# Patient Record
Sex: MALE | Race: BLACK OR AFRICAN AMERICAN | NOT HISPANIC OR LATINO | URBAN - METROPOLITAN AREA
[De-identification: names, ages, dates, MRNs, and addresses within clinical notes are randomized per-mention and may not be internally consistent; named-entity substitution may affect disease eponyms.]

---

## 2020-06-04 ENCOUNTER — OFFICE VISIT (OUTPATIENT)
Dept: URBAN - METROPOLITAN AREA CLINIC 97 | Facility: CLINIC | Age: 55
End: 2020-06-04
Payer: MEDICARE

## 2020-06-04 VITALS
BODY MASS INDEX: 32.21 KG/M2 | TEMPERATURE: 97.6 F | HEIGHT: 70 IN | DIASTOLIC BLOOD PRESSURE: 81 MMHG | SYSTOLIC BLOOD PRESSURE: 108 MMHG | RESPIRATION RATE: 18 BRPM | WEIGHT: 225 LBS

## 2020-06-04 DIAGNOSIS — K51.80 OTHER ULCERATIVE COLITIS WITHOUT COMPLICATION: ICD-10-CM

## 2020-06-04 PROCEDURE — 96415 CHEMO IV INFUSION ADDL HR: CPT | Performed by: INTERNAL MEDICINE

## 2020-06-04 PROCEDURE — 96413 CHEMO IV INFUSION 1 HR: CPT | Performed by: INTERNAL MEDICINE

## 2020-06-04 PROCEDURE — 96375 TX/PRO/DX INJ NEW DRUG ADDON: CPT | Performed by: INTERNAL MEDICINE

## 2020-06-04 RX ORDER — INFLIXIMAB 100 MG/10ML
INJECTION, POWDER, LYOPHILIZED, FOR SOLUTION INTRAVENOUS
Qty: 0 | Refills: 0 | Status: ACTIVE | COMMUNITY
Start: 1900-01-01 | End: 1900-01-01

## 2020-06-04 RX ORDER — OLMESARTAN MEDOXOMIL-HYDROCHLOROTHIAZIDE 12.5; 2 MG/1; MG/1
TABLET, FILM COATED ORAL
Qty: 0 | Refills: 0 | Status: ACTIVE | COMMUNITY
Start: 1900-01-01 | End: 1900-01-01

## 2020-06-04 RX ORDER — GLUCOSAMINE SULFATE 500 MG
TABLET ORAL
Qty: 0 | Refills: 0 | Status: ACTIVE | COMMUNITY
Start: 1900-01-01 | End: 1900-01-01

## 2020-07-30 ENCOUNTER — OFFICE VISIT (OUTPATIENT)
Dept: URBAN - METROPOLITAN AREA CLINIC 97 | Facility: CLINIC | Age: 55
End: 2020-07-30
Payer: MEDICARE

## 2020-07-30 VITALS
TEMPERATURE: 97.6 F | WEIGHT: 216 LBS | RESPIRATION RATE: 18 BRPM | BODY MASS INDEX: 30.92 KG/M2 | HEIGHT: 70 IN | DIASTOLIC BLOOD PRESSURE: 70 MMHG | SYSTOLIC BLOOD PRESSURE: 121 MMHG

## 2020-07-30 DIAGNOSIS — K51.80 CHRONIC PANCOLONIC ULCERATIVE COLITIS: ICD-10-CM

## 2020-07-30 PROCEDURE — 96375 TX/PRO/DX INJ NEW DRUG ADDON: CPT | Performed by: INTERNAL MEDICINE

## 2020-07-30 PROCEDURE — 96413 CHEMO IV INFUSION 1 HR: CPT | Performed by: INTERNAL MEDICINE

## 2020-07-30 PROCEDURE — 96415 CHEMO IV INFUSION ADDL HR: CPT | Performed by: INTERNAL MEDICINE

## 2020-07-30 RX ORDER — GLUCOSAMINE SULFATE 500 MG
TABLET ORAL
Qty: 0 | Refills: 0 | Status: ACTIVE | COMMUNITY
Start: 1900-01-01 | End: 1900-01-01

## 2020-07-30 RX ORDER — INFLIXIMAB 100 MG/10ML
INJECTION, POWDER, LYOPHILIZED, FOR SOLUTION INTRAVENOUS
Qty: 0 | Refills: 0 | Status: ACTIVE | COMMUNITY
Start: 1900-01-01 | End: 1900-01-01

## 2020-07-30 RX ORDER — OLMESARTAN MEDOXOMIL-HYDROCHLOROTHIAZIDE 12.5; 2 MG/1; MG/1
TABLET, FILM COATED ORAL
Qty: 0 | Refills: 0 | Status: ACTIVE | COMMUNITY
Start: 1900-01-01 | End: 1900-01-01

## 2020-08-07 ENCOUNTER — OFFICE VISIT (OUTPATIENT)
Dept: URBAN - METROPOLITAN AREA CLINIC 92 | Facility: CLINIC | Age: 55
End: 2020-08-07
Payer: MEDICARE

## 2020-08-07 VITALS
BODY MASS INDEX: 30.78 KG/M2 | TEMPERATURE: 94.5 F | DIASTOLIC BLOOD PRESSURE: 78 MMHG | HEIGHT: 70 IN | SYSTOLIC BLOOD PRESSURE: 116 MMHG | HEART RATE: 95 BPM | WEIGHT: 215 LBS

## 2020-08-07 DIAGNOSIS — K51.90 ULCERATIVE COLITIS WITHOUT COMPLICATIONS, UNSPECIFIED LOCATION: ICD-10-CM

## 2020-08-07 PROCEDURE — 99214 OFFICE O/P EST MOD 30 MIN: CPT | Performed by: INTERNAL MEDICINE

## 2020-08-07 PROCEDURE — G8417 CALC BMI ABV UP PARAM F/U: HCPCS | Performed by: INTERNAL MEDICINE

## 2020-08-07 PROCEDURE — 82306 VITAMIN D 25 HYDROXY: CPT | Performed by: INTERNAL MEDICINE

## 2020-08-07 PROCEDURE — 3017F COLORECTAL CA SCREEN DOC REV: CPT | Performed by: INTERNAL MEDICINE

## 2020-08-07 PROCEDURE — G8427 DOCREV CUR MEDS BY ELIG CLIN: HCPCS | Performed by: INTERNAL MEDICINE

## 2020-08-07 PROCEDURE — 1036F TOBACCO NON-USER: CPT | Performed by: INTERNAL MEDICINE

## 2020-08-07 RX ORDER — SERTRALINE HYDROCHLORIDE 20 MG/ML
5 ML MIXED WITH 4 OUNCES OF WATER, ORANGE JUICE, LEMONADE, GINGER ALE OR LEMON/LIME SODA SOLUTION ORAL ONCE A DAY
Status: ACTIVE | COMMUNITY

## 2020-08-07 RX ORDER — OLMESARTAN MEDOXOMIL-HYDROCHLOROTHIAZIDE 12.5; 2 MG/1; MG/1
TABLET, FILM COATED ORAL
Qty: 0 | Refills: 0 | Status: ACTIVE | COMMUNITY
Start: 1900-01-01

## 2020-08-07 RX ORDER — FAMOTIDINE 20 MG/1
1 TABLET AT BEDTIME AS NEEDED TABLET, FILM COATED ORAL ONCE A DAY
Status: ACTIVE | COMMUNITY

## 2020-08-07 RX ORDER — INFLIXIMAB 100 MG/10ML
INJECTION, POWDER, LYOPHILIZED, FOR SOLUTION INTRAVENOUS
Qty: 0 | Refills: 0 | Status: ACTIVE | COMMUNITY
Start: 1900-01-01

## 2020-08-07 RX ORDER — GLUCOSAMINE SULFATE 500 MG
TABLET ORAL
Qty: 0 | Refills: 0 | COMMUNITY
Start: 1900-01-01

## 2020-08-07 NOTE — HPI-TODAY'S VISIT:
Pt with hx of UC , left sided dx in 2011. Previously followed by Dr. Osorio. Has been on Remicade since 2011 as he did not respond to oral treatments. No known complications or surgeries. DId have flare symptoms last month and given steroid taper by PCP. Now feels improved. No hematochezia. Intermittent loose stools. No f/c, abd pain, wt loss or joint pains. Last infusion last week.  Colonoscopy in 2019 with 1cm transverse colon adenoma. No bx obtained around polyp base.

## 2020-08-08 LAB
A/G RATIO: 1.6
ALBUMIN: 4.5
ALKALINE PHOSPHATASE: 55
ALT (SGPT): 21
AST (SGOT): 26
BASO (ABSOLUTE): 0
BASOS: 1
BILIRUBIN, TOTAL: 1.1
BUN/CREATININE RATIO: 9
BUN: 14
CALCIUM: 9.7
CARBON DIOXIDE, TOTAL: 20
CHLORIDE: 101
CREATININE: 1.53
EGFR IF AFRICN AM: 59
EGFR IF NONAFRICN AM: 51
EOS (ABSOLUTE): 0.1
EOS: 1
GLOBULIN, TOTAL: 2.8
GLUCOSE: 102
HEMATOCRIT: 44.2
HEMATOLOGY COMMENTS:: (no result)
HEMOGLOBIN: 14.1
IMMATURE CELLS: (no result)
IMMATURE GRANS (ABS): 0.1
IMMATURE GRANULOCYTES: 1
LYMPHS (ABSOLUTE): 2.4
LYMPHS: 27
MCH: 30.7
MCHC: 31.9
MCV: 96
MONOCYTES(ABSOLUTE): 0.9
MONOCYTES: 10
NEUTROPHILS (ABSOLUTE): 5.3
NEUTROPHILS: 60
NRBC: (no result)
PLATELETS: 305
POTASSIUM: 4.3
PROTEIN, TOTAL: 7.3
RBC: 4.6
RDW: 12.6
SEDIMENTATION RATE-WESTERGREN: 7
SODIUM: 140
VITAMIN D, 25-HYDROXY: 20.5
WBC: 8.7

## 2020-08-12 ENCOUNTER — TELEPHONE ENCOUNTER (OUTPATIENT)
Dept: URBAN - METROPOLITAN AREA CLINIC 92 | Facility: CLINIC | Age: 55
End: 2020-08-12

## 2020-08-13 ENCOUNTER — TELEPHONE ENCOUNTER (OUTPATIENT)
Dept: URBAN - METROPOLITAN AREA CLINIC 92 | Facility: CLINIC | Age: 55
End: 2020-08-13

## 2020-08-14 ENCOUNTER — CLAIMS CREATED FROM THE CLAIM WINDOW (OUTPATIENT)
Dept: URBAN - METROPOLITAN AREA CLINIC 4 | Facility: CLINIC | Age: 55
End: 2020-08-14
Payer: MEDICARE

## 2020-08-14 ENCOUNTER — OFFICE VISIT (OUTPATIENT)
Dept: URBAN - METROPOLITAN AREA SURGERY CENTER 16 | Facility: SURGERY CENTER | Age: 55
End: 2020-08-14
Payer: MEDICARE

## 2020-08-14 DIAGNOSIS — K63.89 OTHER SPECIFIED DISEASES OF INTESTINE: ICD-10-CM

## 2020-08-14 DIAGNOSIS — K51.911 ULCERATIVE COLITIS, UNSPECIFIED WITH RECTAL BLEEDING: ICD-10-CM

## 2020-08-14 DIAGNOSIS — K51.20 CHRONIC ULCERATIVE PROCTITIS: ICD-10-CM

## 2020-08-14 DIAGNOSIS — K51.80 CHRONIC PANCOLONIC ULCERATIVE COLITIS: ICD-10-CM

## 2020-08-14 PROCEDURE — 88305 TISSUE EXAM BY PATHOLOGIST: CPT | Performed by: PATHOLOGY

## 2020-08-14 PROCEDURE — 45380 COLONOSCOPY AND BIOPSY: CPT | Performed by: INTERNAL MEDICINE

## 2020-08-14 PROCEDURE — G8907 PT DOC NO EVENTS ON DISCHARG: HCPCS | Performed by: INTERNAL MEDICINE

## 2020-08-14 PROCEDURE — G9937 DIG OR SURV COLSCO: HCPCS | Performed by: INTERNAL MEDICINE

## 2020-08-14 RX ORDER — FAMOTIDINE 20 MG/1
1 TABLET AT BEDTIME AS NEEDED TABLET, FILM COATED ORAL ONCE A DAY
Status: ACTIVE | COMMUNITY

## 2020-08-14 RX ORDER — SERTRALINE HYDROCHLORIDE 20 MG/ML
5 ML MIXED WITH 4 OUNCES OF WATER, ORANGE JUICE, LEMONADE, GINGER ALE OR LEMON/LIME SODA SOLUTION ORAL ONCE A DAY
Status: ACTIVE | COMMUNITY

## 2020-08-14 RX ORDER — INFLIXIMAB 100 MG/10ML
INJECTION, POWDER, LYOPHILIZED, FOR SOLUTION INTRAVENOUS
Qty: 0 | Refills: 0 | Status: ACTIVE | COMMUNITY
Start: 1900-01-01

## 2020-08-14 RX ORDER — OLMESARTAN MEDOXOMIL-HYDROCHLOROTHIAZIDE 12.5; 2 MG/1; MG/1
TABLET, FILM COATED ORAL
Qty: 0 | Refills: 0 | Status: ACTIVE | COMMUNITY
Start: 1900-01-01

## 2020-08-14 RX ORDER — GLUCOSAMINE SULFATE 500 MG
TABLET ORAL
Qty: 0 | Refills: 0 | COMMUNITY
Start: 1900-01-01

## 2020-09-03 ENCOUNTER — TELEPHONE ENCOUNTER (OUTPATIENT)
Dept: URBAN - METROPOLITAN AREA CLINIC 92 | Facility: CLINIC | Age: 55
End: 2020-09-03

## 2020-09-03 ENCOUNTER — OFFICE VISIT (OUTPATIENT)
Dept: URBAN - METROPOLITAN AREA TELEHEALTH 2 | Facility: TELEHEALTH | Age: 55
End: 2020-09-03
Payer: MEDICARE

## 2020-09-03 DIAGNOSIS — E56.8 DEFICIENCY OF OTHER VITAMINS: ICD-10-CM

## 2020-09-03 DIAGNOSIS — K51.811 OTHER ULCERATIVE COLITIS WITH RECTAL BLEEDING: ICD-10-CM

## 2020-09-03 PROCEDURE — G9903 PT SCRN TBCO ID AS NON USER: HCPCS | Performed by: INTERNAL MEDICINE

## 2020-09-03 PROCEDURE — 1036F TOBACCO NON-USER: CPT | Performed by: INTERNAL MEDICINE

## 2020-09-03 PROCEDURE — G8427 DOCREV CUR MEDS BY ELIG CLIN: HCPCS | Performed by: INTERNAL MEDICINE

## 2020-09-03 PROCEDURE — 84999 UNLISTED CHEMISTRY PROCEDURE: CPT | Performed by: INTERNAL MEDICINE

## 2020-09-03 PROCEDURE — 99214 OFFICE O/P EST MOD 30 MIN: CPT | Performed by: INTERNAL MEDICINE

## 2020-09-03 PROCEDURE — 3017F COLORECTAL CA SCREEN DOC REV: CPT | Performed by: INTERNAL MEDICINE

## 2020-09-03 RX ORDER — GLUCOSAMINE SULFATE 500 MG
TABLET ORAL
Qty: 0 | Refills: 0 | COMMUNITY
Start: 1900-01-01

## 2020-09-03 RX ORDER — OLMESARTAN MEDOXOMIL-HYDROCHLOROTHIAZIDE 12.5; 2 MG/1; MG/1
TABLET, FILM COATED ORAL
Qty: 0 | Refills: 0 | Status: ACTIVE | COMMUNITY
Start: 1900-01-01

## 2020-09-03 RX ORDER — INFLIXIMAB 100 MG/10ML
INJECTION, POWDER, LYOPHILIZED, FOR SOLUTION INTRAVENOUS
Qty: 0 | Refills: 0 | Status: ACTIVE | COMMUNITY
Start: 1900-01-01

## 2020-09-03 RX ORDER — FAMOTIDINE 20 MG/1
1 TABLET AT BEDTIME AS NEEDED TABLET, FILM COATED ORAL ONCE A DAY
Status: ACTIVE | COMMUNITY

## 2020-09-03 RX ORDER — SERTRALINE HYDROCHLORIDE 20 MG/ML
5 ML MIXED WITH 4 OUNCES OF WATER, ORANGE JUICE, LEMONADE, GINGER ALE OR LEMON/LIME SODA SOLUTION ORAL ONCE A DAY
Status: ACTIVE | COMMUNITY

## 2020-09-03 NOTE — HPI-TODAY'S VISIT:
Pt with hx of UC , left sided dx in 2011. Previously followed by Dr. Osorio. Has been on Remicade 5mg/lg q8 weeks since 2011 as he did not respond to oral treatments. No known complications or surgeries. DId have flare symptoms last month and given steroid taper by PCP which improved. No hematochezia. Intermittent loose stools. No f/c, abd pain, wt loss or joint pains.   Colonoscopy in 2019 with 1cm transverse colon adenoma. No bx obtained around polyp base. Repeat colonoscopy 8/2020 with mild inflammation upto 15cm from anal verge. Remaining colon normal including bx.   Recent labs with mild CKD (known) and vit d def

## 2020-09-11 ENCOUNTER — TELEPHONE ENCOUNTER (OUTPATIENT)
Dept: URBAN - METROPOLITAN AREA CLINIC 92 | Facility: CLINIC | Age: 55
End: 2020-09-11

## 2020-09-23 ENCOUNTER — OFFICE VISIT (OUTPATIENT)
Dept: URBAN - METROPOLITAN AREA CLINIC 97 | Facility: CLINIC | Age: 55
End: 2020-09-23
Payer: MEDICARE

## 2020-09-23 DIAGNOSIS — K51.90 COLITIS, ULCERATIVE: ICD-10-CM

## 2020-09-23 PROCEDURE — 96375 TX/PRO/DX INJ NEW DRUG ADDON: CPT | Performed by: INTERNAL MEDICINE

## 2020-09-23 PROCEDURE — 96415 CHEMO IV INFUSION ADDL HR: CPT | Performed by: INTERNAL MEDICINE

## 2020-09-23 PROCEDURE — 96413 CHEMO IV INFUSION 1 HR: CPT | Performed by: INTERNAL MEDICINE

## 2020-09-23 RX ORDER — GLUCOSAMINE SULFATE 500 MG
TABLET ORAL
Qty: 0 | Refills: 0 | COMMUNITY
Start: 1900-01-01

## 2020-09-23 RX ORDER — INFLIXIMAB 100 MG/10ML
INJECTION, POWDER, LYOPHILIZED, FOR SOLUTION INTRAVENOUS
Qty: 0 | Refills: 0 | Status: ACTIVE | COMMUNITY
Start: 1900-01-01

## 2020-09-23 RX ORDER — SERTRALINE HYDROCHLORIDE 20 MG/ML
5 ML MIXED WITH 4 OUNCES OF WATER, ORANGE JUICE, LEMONADE, GINGER ALE OR LEMON/LIME SODA SOLUTION ORAL ONCE A DAY
Status: ACTIVE | COMMUNITY

## 2020-09-23 RX ORDER — OLMESARTAN MEDOXOMIL-HYDROCHLOROTHIAZIDE 12.5; 2 MG/1; MG/1
TABLET, FILM COATED ORAL
Qty: 0 | Refills: 0 | Status: ACTIVE | COMMUNITY
Start: 1900-01-01

## 2020-09-23 RX ORDER — FAMOTIDINE 20 MG/1
1 TABLET AT BEDTIME AS NEEDED TABLET, FILM COATED ORAL ONCE A DAY
Status: ACTIVE | COMMUNITY

## 2020-10-06 ENCOUNTER — OFFICE VISIT (OUTPATIENT)
Dept: URBAN - METROPOLITAN AREA TELEHEALTH 2 | Facility: TELEHEALTH | Age: 55
End: 2020-10-06
Payer: MEDICARE

## 2020-10-06 DIAGNOSIS — K51.911 ULCERATIVE COLITIS, UNSPECIFIED WITH RECTAL BLEEDING: ICD-10-CM

## 2020-10-06 DIAGNOSIS — E55.9 VITAMIN D DEFICIENCY: ICD-10-CM

## 2020-10-06 DIAGNOSIS — K51.811 OTHER ULCERATIVE COLITIS WITH RECTAL BLEEDING: ICD-10-CM

## 2020-10-06 PROCEDURE — G8417 CALC BMI ABV UP PARAM F/U: HCPCS | Performed by: INTERNAL MEDICINE

## 2020-10-06 PROCEDURE — 99214 OFFICE O/P EST MOD 30 MIN: CPT | Performed by: INTERNAL MEDICINE

## 2020-10-06 PROCEDURE — 3017F COLORECTAL CA SCREEN DOC REV: CPT | Performed by: INTERNAL MEDICINE

## 2020-10-06 PROCEDURE — G8427 DOCREV CUR MEDS BY ELIG CLIN: HCPCS | Performed by: INTERNAL MEDICINE

## 2020-10-06 RX ORDER — GLUCOSAMINE SULFATE 500 MG
TABLET ORAL
Qty: 0 | Refills: 0 | COMMUNITY
Start: 1900-01-01

## 2020-10-06 RX ORDER — OLMESARTAN MEDOXOMIL-HYDROCHLOROTHIAZIDE 12.5; 2 MG/1; MG/1
TABLET, FILM COATED ORAL
Qty: 0 | Refills: 0 | Status: ACTIVE | COMMUNITY
Start: 1900-01-01

## 2020-10-06 RX ORDER — SERTRALINE HYDROCHLORIDE 20 MG/ML
5 ML MIXED WITH 4 OUNCES OF WATER, ORANGE JUICE, LEMONADE, GINGER ALE OR LEMON/LIME SODA SOLUTION ORAL ONCE A DAY
Status: ACTIVE | COMMUNITY

## 2020-10-06 RX ORDER — FAMOTIDINE 20 MG/1
1 TABLET AT BEDTIME AS NEEDED TABLET, FILM COATED ORAL ONCE A DAY
Status: ACTIVE | COMMUNITY

## 2020-10-06 RX ORDER — INFLIXIMAB 100 MG/10ML
INJECTION, POWDER, LYOPHILIZED, FOR SOLUTION INTRAVENOUS
Qty: 0 | Refills: 0 | Status: ACTIVE | COMMUNITY
Start: 1900-01-01

## 2020-10-06 RX ORDER — ADALIMUMAB 80MG/0.8ML
80MG DAY 1, DAY 2, DAY 15 KIT SUBCUTANEOUS
Qty: 3 | Refills: 0 | OUTPATIENT

## 2020-10-08 ENCOUNTER — TELEPHONE ENCOUNTER (OUTPATIENT)
Dept: URBAN - METROPOLITAN AREA CLINIC 92 | Facility: CLINIC | Age: 55
End: 2020-10-08

## 2020-10-13 ENCOUNTER — TELEPHONE ENCOUNTER (OUTPATIENT)
Dept: URBAN - METROPOLITAN AREA CLINIC 92 | Facility: CLINIC | Age: 55
End: 2020-10-13

## 2020-10-14 ENCOUNTER — LAB OUTSIDE AN ENCOUNTER (OUTPATIENT)
Dept: URBAN - METROPOLITAN AREA CLINIC 92 | Facility: CLINIC | Age: 55
End: 2020-10-14

## 2020-10-19 ENCOUNTER — TELEPHONE ENCOUNTER (OUTPATIENT)
Dept: URBAN - METROPOLITAN AREA CLINIC 92 | Facility: CLINIC | Age: 55
End: 2020-10-19

## 2020-10-22 LAB
QUANTIFERON CRITERIA: (no result)
QUANTIFERON INCUBATION: (no result)
QUANTIFERON MITOGEN VALUE: >10
QUANTIFERON NIL VALUE: 0.37
QUANTIFERON TB1 AG VALUE: 0.48
QUANTIFERON TB2 AG VALUE: 0.46
QUANTIFERON-TB GOLD PLUS: NEGATIVE

## 2020-11-03 ENCOUNTER — TELEPHONE ENCOUNTER (OUTPATIENT)
Dept: URBAN - METROPOLITAN AREA CLINIC 92 | Facility: CLINIC | Age: 55
End: 2020-11-03

## 2020-11-09 ENCOUNTER — TELEPHONE ENCOUNTER (OUTPATIENT)
Dept: URBAN - METROPOLITAN AREA CLINIC 92 | Facility: CLINIC | Age: 55
End: 2020-11-09

## 2020-11-18 ENCOUNTER — OFFICE VISIT (OUTPATIENT)
Dept: URBAN - METROPOLITAN AREA CLINIC 97 | Facility: CLINIC | Age: 55
End: 2020-11-18

## 2020-11-20 ENCOUNTER — ERX REFILL RESPONSE (OUTPATIENT)
Age: 55
End: 2020-11-20

## 2020-11-20 RX ORDER — ADALIMUMAB 80MG/0.8ML
INJECT 80MG UNDER THE SKIN ON DAY 1, DAY 2, AND ON DAY 15 KIT SUBCUTANEOUS
Qty: 3 | Refills: 0

## 2020-11-25 ENCOUNTER — TELEPHONE ENCOUNTER (OUTPATIENT)
Dept: URBAN - METROPOLITAN AREA CLINIC 92 | Facility: CLINIC | Age: 55
End: 2020-11-25

## 2020-11-25 RX ORDER — ADALIMUMAB 40MG/0.4ML
1 SUBQ  Q 2 WEEKS KIT SUBCUTANEOUS
Qty: 6 | Refills: 0 | OUTPATIENT
Start: 2020-12-01 | End: 2021-03-01

## 2020-12-29 ENCOUNTER — OFFICE VISIT (OUTPATIENT)
Dept: URBAN - METROPOLITAN AREA CLINIC 92 | Facility: CLINIC | Age: 55
End: 2020-12-29
Payer: MEDICARE

## 2020-12-29 DIAGNOSIS — K51.911 ULCERATIVE COLITIS, UNSPECIFIED WITH RECTAL BLEEDING: ICD-10-CM

## 2020-12-29 PROCEDURE — G8417 CALC BMI ABV UP PARAM F/U: HCPCS | Performed by: INTERNAL MEDICINE

## 2020-12-29 PROCEDURE — G8482 FLU IMMUNIZE ORDER/ADMIN: HCPCS | Performed by: INTERNAL MEDICINE

## 2020-12-29 PROCEDURE — 4004F PT TOBACCO SCREEN RCVD TLK: CPT | Performed by: INTERNAL MEDICINE

## 2020-12-29 PROCEDURE — G8427 DOCREV CUR MEDS BY ELIG CLIN: HCPCS | Performed by: INTERNAL MEDICINE

## 2020-12-29 PROCEDURE — 3017F COLORECTAL CA SCREEN DOC REV: CPT | Performed by: INTERNAL MEDICINE

## 2020-12-29 PROCEDURE — 99214 OFFICE O/P EST MOD 30 MIN: CPT | Performed by: INTERNAL MEDICINE

## 2020-12-29 RX ORDER — GLUCOSAMINE SULFATE 500 MG
TABLET ORAL
Qty: 0 | Refills: 0 | COMMUNITY
Start: 1900-01-01

## 2020-12-29 RX ORDER — ADALIMUMAB 40MG/0.4ML
1 SUBQ  Q 2 WEEKS KIT SUBCUTANEOUS
Qty: 6 | Refills: 0 | Status: ACTIVE | COMMUNITY
Start: 2020-12-01 | End: 2021-03-01

## 2020-12-29 RX ORDER — OLMESARTAN MEDOXOMIL-HYDROCHLOROTHIAZIDE 12.5; 2 MG/1; MG/1
TABLET, FILM COATED ORAL
Qty: 0 | Refills: 0 | Status: ACTIVE | COMMUNITY
Start: 1900-01-01

## 2020-12-29 RX ORDER — FAMOTIDINE 20 MG/1
1 TABLET AT BEDTIME AS NEEDED TABLET, FILM COATED ORAL ONCE A DAY
Status: ACTIVE | COMMUNITY

## 2020-12-29 NOTE — HPI-TODAY'S VISIT:
Pt with hx of UC , left sided dx in 2011. Previously followed by Dr. Osorio. Has been on Remicade 5mg/lg q8 weeks since 2011 as he did not respond to oral treatments. No known complications or surgeries. Pt was flaring and Prometheus panel shows undetectable remicade levels and presence of antibodies. He was transitioned to Humira in 11/2020 and now feels back to baseline. 1BM per day. Non-bloody. Previous joint pains have resolved.   Colonoscopy in 2019 with 1cm transverse colon adenoma. No bx obtained around polyp base. Repeat colonoscopy 8/2020 with mild inflammation upto 15cm from anal verge. Remaining colon normal including bx.

## 2020-12-30 LAB
A/G RATIO: 1.4
ALBUMIN: 4.3
ALKALINE PHOSPHATASE: 65
ALT (SGPT): 17
AST (SGOT): 29
BASO (ABSOLUTE): 0
BASOS: 1
BILIRUBIN, TOTAL: 0.8
BUN/CREATININE RATIO: 14
BUN: 17
CALCIUM: 10.2
CARBON DIOXIDE, TOTAL: 22
CHLORIDE: 101
CREATININE: 1.23
EGFR IF AFRICN AM: 77
EGFR IF NONAFRICN AM: 67
EOS (ABSOLUTE): 0.1
EOS: 2
GLOBULIN, TOTAL: 3
GLUCOSE: 92
HEMATOCRIT: 49.2
HEMATOLOGY COMMENTS:: (no result)
HEMOGLOBIN: 16.8
IMMATURE CELLS: (no result)
IMMATURE GRANS (ABS): 0
IMMATURE GRANULOCYTES: 0
LYMPHS (ABSOLUTE): 3.1
LYMPHS: 55
MCH: 30.5
MCHC: 34.1
MCV: 90
MONOCYTES(ABSOLUTE): 0.5
MONOCYTES: 8
NEUTROPHILS (ABSOLUTE): 2
NEUTROPHILS: 34
NRBC: (no result)
PLATELETS: 224
POTASSIUM: 4.3
PROTEIN, TOTAL: 7.3
RBC: 5.5
RDW: 13.2
SODIUM: 140
VITAMIN D, 25-HYDROXY: 86.6
WBC: 5.7

## 2021-01-13 ENCOUNTER — TELEPHONE ENCOUNTER (OUTPATIENT)
Dept: URBAN - METROPOLITAN AREA CLINIC 92 | Facility: CLINIC | Age: 56
End: 2021-01-13

## 2021-02-23 ENCOUNTER — TELEPHONE ENCOUNTER (OUTPATIENT)
Dept: URBAN - METROPOLITAN AREA CLINIC 92 | Facility: CLINIC | Age: 56
End: 2021-02-23

## 2021-02-23 RX ORDER — ADALIMUMAB 40MG/0.4ML
1 SUBQ  Q 2 WEEKS KIT SUBCUTANEOUS
Qty: 6 | Refills: 0 | OUTPATIENT
Start: 2021-02-24 | End: 2021-05-25

## 2021-03-30 ENCOUNTER — TELEPHONE ENCOUNTER (OUTPATIENT)
Dept: URBAN - METROPOLITAN AREA CLINIC 92 | Facility: CLINIC | Age: 56
End: 2021-03-30

## 2021-04-05 ENCOUNTER — TELEPHONE ENCOUNTER (OUTPATIENT)
Dept: URBAN - METROPOLITAN AREA CLINIC 92 | Facility: CLINIC | Age: 56
End: 2021-04-05

## 2021-05-24 ENCOUNTER — TELEPHONE ENCOUNTER (OUTPATIENT)
Dept: URBAN - METROPOLITAN AREA CLINIC 92 | Facility: CLINIC | Age: 56
End: 2021-05-24

## 2021-05-28 ENCOUNTER — TELEPHONE ENCOUNTER (OUTPATIENT)
Dept: URBAN - METROPOLITAN AREA CLINIC 92 | Facility: CLINIC | Age: 56
End: 2021-05-28

## 2021-06-03 ENCOUNTER — LAB OUTSIDE AN ENCOUNTER (OUTPATIENT)
Dept: URBAN - METROPOLITAN AREA CLINIC 92 | Facility: CLINIC | Age: 56
End: 2021-06-03

## 2021-06-03 ENCOUNTER — OFFICE VISIT (OUTPATIENT)
Dept: URBAN - METROPOLITAN AREA CLINIC 92 | Facility: CLINIC | Age: 56
End: 2021-06-03
Payer: MEDICARE

## 2021-06-03 DIAGNOSIS — K51.911 ULCERATIVE COLITIS, UNSPECIFIED WITH RECTAL BLEEDING: ICD-10-CM

## 2021-06-03 PROCEDURE — 99214 OFFICE O/P EST MOD 30 MIN: CPT | Performed by: INTERNAL MEDICINE

## 2021-06-03 RX ORDER — GLUCOSAMINE SULFATE 500 MG
TABLET ORAL
Qty: 0 | Refills: 0 | COMMUNITY
Start: 1900-01-01

## 2021-06-03 RX ORDER — MESALAMINE 1000 MG/1
1 SUPPOSITORY AT BEDTIME SUPPOSITORY RECTAL ONCE A DAY
Qty: 30 | OUTPATIENT
Start: 2021-06-03 | End: 2021-07-03

## 2021-06-03 RX ORDER — FAMOTIDINE 20 MG/1
1 TABLET AT BEDTIME AS NEEDED TABLET, FILM COATED ORAL ONCE A DAY
Status: ACTIVE | COMMUNITY

## 2021-06-03 RX ORDER — OLMESARTAN MEDOXOMIL-HYDROCHLOROTHIAZIDE 12.5; 2 MG/1; MG/1
TABLET, FILM COATED ORAL
Qty: 0 | Refills: 0 | Status: ON HOLD | COMMUNITY
Start: 1900-01-01

## 2021-06-03 RX ORDER — BUDESONIDE 9 MG/1
1 TABLET IN THE MORNING TABLET, EXTENDED RELEASE ORAL ONCE A DAY
Qty: 30 | Refills: 0 | OUTPATIENT
Start: 2021-06-03 | End: 2021-07-03

## 2021-06-03 NOTE — HPI-TODAY'S VISIT:
Pt with hx of UC , left sided dx in 2011. Previously followed by Dr. Osorio. Had been on Remicade 5mg/lg q8 weeks since 2011 as he did not respond to oral treatments. No known complications or surgeries. On Remicade GI sx got much better but never in clinical remission and had persistent joint pains. Pt was flaring in the fall of 2020 and Prometheus panel showed undetectable remicade levels and presence of antibodies. He was transitioned to Humira in 11/2020, on Q2W, last injection last Tuesday. During induction felt good but with maintenance started to have increase in sx--1 pen he misfired in Jan. Noticed hematochezia in January, at first light and now increased to BRB with all BMs. BMs 10-15 times/day, runny but hard to pass as feels like rectal area inflammed. Lost 7#. Started prednisone last Friday 10mg w/ mild improvement during the day but still sign nocturnal stools. No abd pain, N/V, fevers or chills. Had migratory joint pains with Humira, improved with steroids. Prednsione causing increased blood sugar. Also started to notice some skin rashes, itchy and improved with hydrocortisone.  Colonoscopy in 2019 with 1cm transverse colon adenoma. No bx obtained around polyp base. Repeat colonoscopy 8/2020 with mild inflammation upto 15cm from anal verge. Bx + chronic active proctitis Remaining colon normal including bx

## 2021-06-04 LAB
HBSAG SCREEN: NEGATIVE
HEP B CORE AB, TOT: POSITIVE
HEPATITIS B SURF AB QUANT: 51.1

## 2021-06-05 LAB
QUANTIFERON CRITERIA: (no result)
QUANTIFERON INCUBATION: (no result)
QUANTIFERON MITOGEN VALUE: 0.35
QUANTIFERON NIL VALUE: 0
QUANTIFERON TB1 AG VALUE: 0
QUANTIFERON TB2 AG VALUE: 0.04
QUANTIFERON-TB GOLD PLUS: (no result)

## 2021-06-07 ENCOUNTER — LAB OUTSIDE AN ENCOUNTER (OUTPATIENT)
Dept: URBAN - METROPOLITAN AREA CLINIC 86 | Facility: CLINIC | Age: 56
End: 2021-06-07

## 2021-06-07 ENCOUNTER — OFFICE VISIT (OUTPATIENT)
Dept: URBAN - METROPOLITAN AREA CLINIC 86 | Facility: CLINIC | Age: 56
End: 2021-06-07
Payer: MEDICARE

## 2021-06-07 ENCOUNTER — TELEPHONE ENCOUNTER (OUTPATIENT)
Dept: URBAN - METROPOLITAN AREA CLINIC 92 | Facility: CLINIC | Age: 56
End: 2021-06-07

## 2021-06-07 DIAGNOSIS — E66.9 OBESITY (BMI 30-39.9): ICD-10-CM

## 2021-06-07 DIAGNOSIS — Z79.899 HIGH RISK MEDICATION USE: ICD-10-CM

## 2021-06-07 DIAGNOSIS — Z71.89 VACCINE COUNSELING: ICD-10-CM

## 2021-06-07 DIAGNOSIS — R19.7 DIARRHEA: ICD-10-CM

## 2021-06-07 DIAGNOSIS — R63.4 WEIGHT LOSS: ICD-10-CM

## 2021-06-07 DIAGNOSIS — R89.4 HEPATITIS B CORE ANTIBODY POSITIVE: ICD-10-CM

## 2021-06-07 DIAGNOSIS — K51.80 CHRONIC PANCOLONIC ULCERATIVE COLITIS: ICD-10-CM

## 2021-06-07 DIAGNOSIS — R76.11 ABNORMAL REACTION TO TUBERCULIN TEST: ICD-10-CM

## 2021-06-07 PROBLEM — 165346000: Status: ACTIVE | Noted: 2021-06-07

## 2021-06-07 PROCEDURE — 99244 OFF/OP CNSLTJ NEW/EST MOD 40: CPT

## 2021-06-07 PROCEDURE — 99214 OFFICE O/P EST MOD 30 MIN: CPT

## 2021-06-07 RX ORDER — OLMESARTAN MEDOXOMIL-HYDROCHLOROTHIAZIDE 12.5; 2 MG/1; MG/1
TABLET, FILM COATED ORAL
Qty: 0 | Refills: 0 | Status: DISCONTINUED | COMMUNITY
Start: 1900-01-01

## 2021-06-07 RX ORDER — COLCHICINE 0.6 MG/1
1 TABLET TABLET, FILM COATED ORAL QD
Refills: 0 | Status: ACTIVE | COMMUNITY
Start: 1900-01-01

## 2021-06-07 RX ORDER — OLMESARTAN MEDOXOMIL 20 MG/1
1 TABLET TABLET ORAL ONCE A DAY
Status: ACTIVE | COMMUNITY

## 2021-06-07 RX ORDER — ADALIMUMAB 40MG/0.4ML
0.8 ML KIT SUBCUTANEOUS QOW
Status: ACTIVE | COMMUNITY

## 2021-06-07 RX ORDER — TENOFOVIR DISPROXIL FUMARATE 300 MG/1
1 TABLET TABLET ORAL ONCE A DAY
Qty: 30 | Refills: 1 | OUTPATIENT

## 2021-06-07 RX ORDER — FAMOTIDINE 20 MG/1
1 TABLET AT BEDTIME AS NEEDED TABLET, FILM COATED ORAL ONCE A DAY
Status: ACTIVE | COMMUNITY

## 2021-06-07 RX ORDER — MESALAMINE 1000 MG/1
1 SUPPOSITORY AT BEDTIME SUPPOSITORY RECTAL ONCE A DAY
Qty: 30 | Status: ACTIVE | COMMUNITY
Start: 2021-06-03 | End: 2021-07-03

## 2021-06-07 RX ORDER — GLUCOSAMINE SULFATE 500 MG
TABLET ORAL
Qty: 0 | Refills: 0 | Status: DISCONTINUED | COMMUNITY
Start: 1900-01-01

## 2021-06-07 RX ORDER — BUDESONIDE 9 MG/1
1 TABLET IN THE MORNING TABLET, EXTENDED RELEASE ORAL ONCE A DAY
Qty: 30 | Refills: 0 | Status: ACTIVE | COMMUNITY
Start: 2021-06-03 | End: 2021-07-03

## 2021-06-07 NOTE — HPI-TODAY'S VISIT:
Patient is a 56-year-old male referred by Dr. Yonatan Silva for evaluation of liver issue if the hep b core positive and need for prophylaxis.  A copy of the note will be sent to Dr. Yonatan Silva.  Patient was last seen back on Radha 3 by Namrata Pillai PA-C, for his noted  hx of history of ulcerative colitis left-sided first noted in 2011.  Previously followed by Dr. Rasmussen and few others . Also had seen Dr En Mattson as well prior.   Had been on Remicade 5 mg/kg every 8 weeks since 2011 and did not respond to oral treatment prior.  No known complications or surgeries noted.  Patient on the Remicade had been much better but never was in clinical remission and still with persistent joint pains.  Patient in the fall 2020 was noted  flaring and a Prometheus panel showed undetected Remicade levels and presence of antibodies.  He was transitioned to Humira in November 2020 on a every 2-week basis and is being followed for this. He says when getting higher dose doing beter and then more issues when lower.  During induction he felt well but with maintenance started had some increase in symptoms.  1 pen misfired in January.  Noted to have some hematochezia in January and then more increased bright red blood per rectum with bowel movements.  10 to 15 bms per day. Patient lost 7 pounds.  Patient started on prednisone now in June at 10 mg a day with some mild improvement during the day but still with nocturnal stools. Changed to budesonide 9mg a day. Also to do a suppository but trouble keeping that in now.  He says the joint symptoms starting to do better with re the migratory joint pains with that Humira improved with steroids.   Prednisone was causing his sugars to be up and now budesonide.  BM 5-7 and some solid.  Noted to have some skin rashes noted and says that improved with hydrocortisone. Also one on legs and arm and says that they come and go.  Colonoscopy 2019 with 1 cm transverse colon adenoma.  August 2020 colonoscopy mild inflammation up to 15 cm from anal verge biopsies showed chronic active proctitis.  Remaining colon normal.  Patient listed as taking some herbals specifically a papaya enzyme extract, some glucosamine sulfate, aloe vera, omega-3 as well as a probiotic.  Also noted to be on allopurinol and colchicine for gout.  He stopped herbals.  Weight noted to be 28.55 BMI.  Liver enzymes show bilirubin 0.8 alkaline phosphatase 65 AST 29 ALT 17 back on December 2020.  White cell count 5.7 hemoglobin 60.8 plate count 224.  Glucose 92 BUN of 17 creatinine 1.23 sodium 140 potassium 4.3.  Patient had hepatitis as well as quantiferon on Radha 3 and was indeterminant and to get cxr for tb. No symptoms from this.  October 2020 labs showed QuantiFERON gold plus was negative.  White cell count 5.7 hemoglobin 16.8 platelet count 224 back in December 2020 AST then 29 ALT 17 alk phos 65 total bilirubin 0.8 sodium 140 potassium 4.3 glucose 92 BUN of 17 creatinine 1.23.  Vitamin D level 86.6.  June 2021 labs show hep B surface antigen negative hep B surface antibody immune at 51.1. 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  he retired  and did shots and he does not remember having the hep b.  These meds on suppress immune system. Can reactivate the hep. 2011 to now not reactivated but never know and also havign some ab and not as immune suppressed and now on steroids and new med.  Typically need a medicine to prevent this as if does reactivate and it does not usually clear as most people stay on this.  Some meds follow and do labs 1-3 m and treat as needed if low risk but this class is high risk.  HBV prevention meds have risk and need to follow labs as they can affect  kidneys and bones can be issues.  He is also on steroids and if this persists then bone risk and the first line med for hep b tenofovir df may have more risk than tenofovir af that less bone and renal risk and have to consider,  Bone density not done and need one and we can can ask Namrata re this and if abn then we need to look at starting that cliff if to stay on steroids.  Older records: October 2015 hep B DNA not detected.  Hep B surface antigen negative.   Plan 1.  Need liver imaging to assess liver with a history of the B core positive. 2.  Order labs now and in 1m and see how doingon the tenodfovir df and follow. 3. If renal issues consider tenofovir af and/or if bone issues seen or staying on chronic steroids. 4. Pt and i disucssed this.  Cautioned pt re the pandemic to use strict hand washing, wear mask even if the covid 19 vaccine is or has not been obtained, socially distance, and look to the cdc web page or announcements for  updates as this a moving target and lots of updates coming at us re the COVID 19.  Duration of the visit was 55 min with 20 min of chart prep and review and then entering into ecw and then 34 min of the video visit via [employer] video for the pt and in reviewing info with him.

## 2021-06-09 LAB — CALPROTECTIN, FECAL: (no result)

## 2021-06-15 ENCOUNTER — TELEPHONE ENCOUNTER (OUTPATIENT)
Dept: URBAN - METROPOLITAN AREA CLINIC 92 | Facility: CLINIC | Age: 56
End: 2021-06-15

## 2021-06-16 ENCOUNTER — TELEPHONE ENCOUNTER (OUTPATIENT)
Dept: URBAN - METROPOLITAN AREA CLINIC 92 | Facility: CLINIC | Age: 56
End: 2021-06-16

## 2021-06-23 ENCOUNTER — TELEPHONE ENCOUNTER (OUTPATIENT)
Dept: URBAN - METROPOLITAN AREA CLINIC 92 | Facility: CLINIC | Age: 56
End: 2021-06-23

## 2021-06-28 ENCOUNTER — TELEPHONE ENCOUNTER (OUTPATIENT)
Dept: URBAN - METROPOLITAN AREA CLINIC 92 | Facility: CLINIC | Age: 56
End: 2021-06-28

## 2021-06-28 ENCOUNTER — OFFICE VISIT (OUTPATIENT)
Dept: URBAN - METROPOLITAN AREA CLINIC 117 | Facility: CLINIC | Age: 56
End: 2021-06-28
Payer: MEDICARE

## 2021-06-28 DIAGNOSIS — Z12.11 COLON CANCER SCREENING: ICD-10-CM

## 2021-06-28 PROCEDURE — 992 APS NON BILLABLE

## 2021-06-30 ENCOUNTER — OFFICE VISIT (OUTPATIENT)
Dept: URBAN - METROPOLITAN AREA CLINIC 92 | Facility: CLINIC | Age: 56
End: 2021-06-30

## 2021-07-02 ENCOUNTER — TELEPHONE ENCOUNTER (OUTPATIENT)
Dept: URBAN - METROPOLITAN AREA CLINIC 92 | Facility: CLINIC | Age: 56
End: 2021-07-02

## 2021-07-02 PROBLEM — 166717003: Status: ACTIVE | Noted: 2021-07-02

## 2021-07-02 LAB
A/G RATIO: 1.3
ALBUMIN: 4.3
ALKALINE PHOSPHATASE: 95
ALT (SGPT): 8
AST (SGOT): 13
BASO (ABSOLUTE): 0
BASOS: 0
BILIRUBIN, TOTAL: 0.5
BUN/CREATININE RATIO: 17
BUN: 28
CALCIUM: 9.9
CARBON DIOXIDE, TOTAL: 24
CHLORIDE: 98
CREATININE: 1.61
EGFR IF AFRICN AM: 55
EGFR IF NONAFRICN AM: 48
EOS (ABSOLUTE): 0.2
EOS: 2
GLOBULIN, TOTAL: 3.2
GLUCOSE: 92
HBV LOG10: (no result)
HEMATOCRIT: 44.3
HEMATOLOGY COMMENTS:: (no result)
HEMOGLOBIN: 14.5
HEP A AB, TOTAL: POSITIVE
HEPATITIS B QUANTITATION: (no result)
IMMATURE CELLS: (no result)
IMMATURE GRANS (ABS): 0.1
IMMATURE GRANULOCYTES: 1
LYMPHS (ABSOLUTE): 2.8
LYMPHS: 40
MCH: 30.6
MCHC: 32.7
MCV: 94
MONOCYTES(ABSOLUTE): 0.6
MONOCYTES: 8
NEUTROPHILS (ABSOLUTE): 3.4
NEUTROPHILS: 49
NRBC: (no result)
PLATELETS: 315
POTASSIUM: 4.9
PROTEIN, TOTAL: 7.5
RBC: 4.74
RDW: 12.5
SODIUM: 137
TEST INFORMATION:: (no result)
WBC: 7

## 2021-07-02 NOTE — HPI-TODAY'S VISIT:
Dear Jerzy Hardy, June 23 labs show hepatitis B immunity so you are protected.  Hep B DNA not detected.  Glucose 92 BUN of 28 creatinine is elevated at 1.61 and has varied from 1.23 up to 1.53 before.  We need to look at your hydration status with these changes.  The summer heat has been kicking up lately.  Sodium 137 potassium 4.9 calcium 9.9 albumin 4.3 bilirubin 0.5 alkaline phosphatase 95 AST down to 13 ALT of 8 previously AST 29 ALT 17 so liver enzymes definitely lower.  White blood cell count 7 hemoglobin 14.5 platelet count 315 MCV 94 neutrophils 3.4. Per notes on tenofovir df po qd and sto still above 50. Egfr 55. Having diarrhea and so hydration may be issue. Spoke with pt to do labs in 2 weeks. He says to start new Entyvio soon and pending. If not better he will let Namrata know re that issue. Dr Harrington

## 2021-07-05 ENCOUNTER — LAB OUTSIDE AN ENCOUNTER (OUTPATIENT)
Dept: URBAN - METROPOLITAN AREA CLINIC 86 | Facility: CLINIC | Age: 56
End: 2021-07-05

## 2021-07-06 ENCOUNTER — TELEPHONE ENCOUNTER (OUTPATIENT)
Dept: URBAN - METROPOLITAN AREA CLINIC 86 | Facility: CLINIC | Age: 56
End: 2021-07-06

## 2021-07-07 ENCOUNTER — TELEPHONE ENCOUNTER (OUTPATIENT)
Dept: URBAN - METROPOLITAN AREA CLINIC 92 | Facility: CLINIC | Age: 56
End: 2021-07-07

## 2021-07-12 ENCOUNTER — LAB OUTSIDE AN ENCOUNTER (OUTPATIENT)
Dept: URBAN - METROPOLITAN AREA CLINIC 92 | Facility: CLINIC | Age: 56
End: 2021-07-12

## 2021-07-12 ENCOUNTER — OFFICE VISIT (OUTPATIENT)
Dept: URBAN - METROPOLITAN AREA CLINIC 97 | Facility: CLINIC | Age: 56
End: 2021-07-12
Payer: MEDICARE

## 2021-07-12 VITALS
DIASTOLIC BLOOD PRESSURE: 66 MMHG | TEMPERATURE: 98.2 F | HEART RATE: 83 BPM | RESPIRATION RATE: 17 BRPM | WEIGHT: 195 LBS | HEIGHT: 70 IN | BODY MASS INDEX: 27.92 KG/M2 | SYSTOLIC BLOOD PRESSURE: 103 MMHG

## 2021-07-12 DIAGNOSIS — K51.80 CHRONIC PANCOLONIC ULCERATIVE COLITIS: ICD-10-CM

## 2021-07-12 PROCEDURE — 96365 THER/PROPH/DIAG IV INF INIT: CPT | Performed by: INTERNAL MEDICINE

## 2021-07-12 RX ORDER — OLMESARTAN MEDOXOMIL 20 MG/1
1 TABLET TABLET ORAL ONCE A DAY
Status: ACTIVE | COMMUNITY

## 2021-07-12 RX ORDER — TENOFOVIR DISPROXIL FUMARATE 300 MG/1
1 TABLET TABLET ORAL ONCE A DAY
Qty: 30 | Refills: 1 | Status: ACTIVE | COMMUNITY

## 2021-07-12 RX ORDER — FAMOTIDINE 20 MG/1
1 TABLET AT BEDTIME AS NEEDED TABLET, FILM COATED ORAL ONCE A DAY
Status: ACTIVE | COMMUNITY

## 2021-07-12 RX ORDER — ADALIMUMAB 40MG/0.4ML
0.8 ML KIT SUBCUTANEOUS QOW
Status: ACTIVE | COMMUNITY

## 2021-07-12 RX ORDER — COLCHICINE 0.6 MG/1
1 TABLET TABLET, FILM COATED ORAL QD
Refills: 0 | Status: ACTIVE | COMMUNITY
Start: 1900-01-01

## 2021-07-14 ENCOUNTER — OFFICE VISIT (OUTPATIENT)
Dept: URBAN - METROPOLITAN AREA CLINIC 92 | Facility: CLINIC | Age: 56
End: 2021-07-14
Payer: MEDICARE

## 2021-07-14 ENCOUNTER — LAB OUTSIDE AN ENCOUNTER (OUTPATIENT)
Dept: URBAN - METROPOLITAN AREA CLINIC 92 | Facility: CLINIC | Age: 56
End: 2021-07-14

## 2021-07-14 VITALS
WEIGHT: 192 LBS | HEART RATE: 83 BPM | TEMPERATURE: 98 F | DIASTOLIC BLOOD PRESSURE: 65 MMHG | BODY MASS INDEX: 27.49 KG/M2 | SYSTOLIC BLOOD PRESSURE: 103 MMHG | HEIGHT: 70 IN

## 2021-07-14 DIAGNOSIS — K51.00 ULCERATIVE PANCOLITIS WITHOUT COMPLICATION: ICD-10-CM

## 2021-07-14 PROCEDURE — 99214 OFFICE O/P EST MOD 30 MIN: CPT | Performed by: INTERNAL MEDICINE

## 2021-07-14 RX ORDER — COLCHICINE 0.6 MG/1
1 TABLET TABLET, FILM COATED ORAL QD
Refills: 0 | Status: ACTIVE | COMMUNITY
Start: 1900-01-01

## 2021-07-14 RX ORDER — TENOFOVIR DISPROXIL FUMARATE 300 MG/1
1 TABLET TABLET ORAL ONCE A DAY
Qty: 30 | Refills: 1 | Status: ACTIVE | COMMUNITY

## 2021-07-14 RX ORDER — OLMESARTAN MEDOXOMIL 20 MG/1
1 TABLET TABLET ORAL ONCE A DAY
Status: ACTIVE | COMMUNITY

## 2021-07-14 RX ORDER — FAMOTIDINE 20 MG/1
1 TABLET AT BEDTIME AS NEEDED TABLET, FILM COATED ORAL ONCE A DAY
Status: ACTIVE | COMMUNITY

## 2021-07-14 RX ORDER — ADALIMUMAB 40MG/0.4ML
0.8 ML KIT SUBCUTANEOUS QOW
Status: ACTIVE | COMMUNITY

## 2021-07-14 NOTE — HPI-TODAY'S VISIT:
Pt with hx of UC , left sided dx in 2011. Previously followed by Dr. Osorio. Had been on Remicade 5mg/lg q8  weeks since 2011 as he did not respond to oral treatments. No known complications or surgeries. On     Remicade GI sx got much better but never in clinical remission and had persistent joint pains. Pt was flaring in the fall of 2020 and Prometheus panel showed undetectable remicade levels and presence of antibodies. He was transitioned to Humira in 11/2020, on Q2W but despite this notes diarrhea, hematochezia and weight loss as well as arthritis. Drug levels 1.7 + detectable AB. Other labs as below (quant indeterminate) Given prednisone taper which he completed one week ago and s/p Entyvio X 1 with resolution of joint and abdominal pain. Diarrhea improved but still with BM 5-7/day including nocturnal stools, urgency and hematochezia. + weight loss. Has had some formed stools since Entyvio. Quant as below indeterminate. CXR no signs of TB. T spot not done.  Seeing Dr. TALAVERA for hx of + Hep B core, on viread.  Colonoscopy in 2019 with 1cm transverse colon adenoma. No bx obtained around polyp base. Repeat colonoscopy 8/2020 with mild inflammation upto 15cm from anal verge. Bx + chronic active proctitis Remaining colon normal including bx

## 2021-07-16 LAB
QUANTIFERON CRITERIA: (no result)
QUANTIFERON INCUBATION: (no result)
QUANTIFERON MITOGEN VALUE: >10
QUANTIFERON NIL VALUE: 0.02
QUANTIFERON TB1 AG VALUE: 0.03
QUANTIFERON TB2 AG VALUE: 0.04
QUANTIFERON-TB GOLD PLUS: NEGATIVE

## 2021-07-26 ENCOUNTER — OFFICE VISIT (OUTPATIENT)
Dept: URBAN - METROPOLITAN AREA CLINIC 97 | Facility: CLINIC | Age: 56
End: 2021-07-26
Payer: MEDICARE

## 2021-07-26 VITALS
WEIGHT: 195 LBS | TEMPERATURE: 98.4 F | HEART RATE: 90 BPM | DIASTOLIC BLOOD PRESSURE: 60 MMHG | BODY MASS INDEX: 27.92 KG/M2 | RESPIRATION RATE: 17 BRPM | HEIGHT: 70 IN | SYSTOLIC BLOOD PRESSURE: 93 MMHG

## 2021-07-26 DIAGNOSIS — K51.80 CHRONIC PANCOLONIC ULCERATIVE COLITIS: ICD-10-CM

## 2021-07-26 PROCEDURE — 96365 THER/PROPH/DIAG IV INF INIT: CPT | Performed by: INTERNAL MEDICINE

## 2021-07-26 RX ORDER — TENOFOVIR DISPROXIL FUMARATE 300 MG/1
1 TABLET TABLET ORAL ONCE A DAY
Qty: 30 | Refills: 1 | Status: ACTIVE | COMMUNITY

## 2021-07-26 RX ORDER — OLMESARTAN MEDOXOMIL 20 MG/1
1 TABLET TABLET ORAL ONCE A DAY
Status: ACTIVE | COMMUNITY

## 2021-07-26 RX ORDER — COLCHICINE 0.6 MG/1
1 TABLET TABLET, FILM COATED ORAL QD
Refills: 0 | Status: ACTIVE | COMMUNITY
Start: 1900-01-01

## 2021-07-26 RX ORDER — FAMOTIDINE 20 MG/1
1 TABLET AT BEDTIME AS NEEDED TABLET, FILM COATED ORAL ONCE A DAY
Status: ACTIVE | COMMUNITY

## 2021-07-26 RX ORDER — ADALIMUMAB 40MG/0.4ML
0.8 ML KIT SUBCUTANEOUS QOW
Status: ACTIVE | COMMUNITY

## 2021-07-28 ENCOUNTER — ERX REFILL RESPONSE (OUTPATIENT)
Dept: URBAN - METROPOLITAN AREA CLINIC 86 | Facility: CLINIC | Age: 56
End: 2021-07-28

## 2021-07-28 RX ORDER — TENOFOVIR DISOPROXIL FUMARATE 300 MG/1
TAKE ONE TABLET BY MOUTH DAILY TABLET, FILM COATED ORAL
Qty: 30 TABLET | Refills: 1 | OUTPATIENT

## 2021-07-28 RX ORDER — TENOFOVIR DISPROXIL FUMARATE 300 MG/1
1 TABLET TABLET ORAL ONCE A DAY
Qty: 30 | Refills: 1 | OUTPATIENT

## 2021-07-29 ENCOUNTER — OFFICE VISIT (OUTPATIENT)
Dept: URBAN - METROPOLITAN AREA CLINIC 92 | Facility: CLINIC | Age: 56
End: 2021-07-29

## 2021-07-30 ENCOUNTER — TELEPHONE ENCOUNTER (OUTPATIENT)
Dept: URBAN - METROPOLITAN AREA CLINIC 92 | Facility: CLINIC | Age: 56
End: 2021-07-30

## 2021-08-02 ENCOUNTER — LAB OUTSIDE AN ENCOUNTER (OUTPATIENT)
Dept: URBAN - METROPOLITAN AREA CLINIC 86 | Facility: CLINIC | Age: 56
End: 2021-08-02

## 2021-08-09 ENCOUNTER — OFFICE VISIT (OUTPATIENT)
Dept: URBAN - METROPOLITAN AREA TELEHEALTH 2 | Facility: TELEHEALTH | Age: 56
End: 2021-08-09
Payer: MEDICARE

## 2021-08-09 DIAGNOSIS — R19.7 DIARRHEA: ICD-10-CM

## 2021-08-09 DIAGNOSIS — Z79.899 HIGH RISK MEDICATION USE: ICD-10-CM

## 2021-08-09 DIAGNOSIS — R89.4 HEPATITIS B CORE ANTIBODY POSITIVE: ICD-10-CM

## 2021-08-09 DIAGNOSIS — K51.80 CHRONIC PANCOLONIC ULCERATIVE COLITIS: ICD-10-CM

## 2021-08-09 PROBLEM — 737222005: Status: ACTIVE | Noted: 2021-06-07

## 2021-08-09 PROCEDURE — 99214 OFFICE O/P EST MOD 30 MIN: CPT

## 2021-08-09 RX ORDER — ADALIMUMAB 40MG/0.4ML
0.8 ML KIT SUBCUTANEOUS QOW
Status: DISCONTINUED | COMMUNITY

## 2021-08-09 RX ORDER — TENOFOVIR DISOPROXIL FUMARATE 300 MG/1
TAKE ONE TABLET BY MOUTH DAILY TABLET, FILM COATED ORAL
Qty: 30 TABLET | Refills: 1 | Status: ACTIVE | COMMUNITY

## 2021-08-09 RX ORDER — TENOFOVIR DISPROXIL FUMARATE 300 MG/1
1 TABLET TABLET ORAL ONCE A DAY
Qty: 30 | Refills: 2 | OUTPATIENT

## 2021-08-09 RX ORDER — VEDOLIZUMAB 300 MG/5ML
AS DIRECTED INJECTION, POWDER, LYOPHILIZED, FOR SOLUTION INTRAVENOUS
Status: ACTIVE | COMMUNITY

## 2021-08-09 RX ORDER — COLCHICINE 0.6 MG/1
1 TABLET TABLET, FILM COATED ORAL QD PRN
Refills: 0 | Status: ACTIVE | COMMUNITY
Start: 1900-01-01

## 2021-08-09 RX ORDER — BUDESONIDE 3 MG/1
3 CAPSULES CAPSULE, COATED PELLETS ORAL ONCE A DAY
Status: ACTIVE | COMMUNITY

## 2021-08-09 RX ORDER — LACTOBACILLUS RHAMNOSUS GG 10B CELL
AS DIRECTED CAPSULE ORAL
Status: ACTIVE | COMMUNITY

## 2021-08-09 RX ORDER — OLMESARTAN MEDOXOMIL 20 MG/1
1 TABLET TABLET ORAL ONCE A DAY
Status: ACTIVE | COMMUNITY

## 2021-08-09 RX ORDER — MESALAMINE 1000 MG/1
1 SUPPOSITORY AT BEDTIME SUPPOSITORY RECTAL ONCE A DAY
Status: ACTIVE | COMMUNITY

## 2021-08-09 RX ORDER — FAMOTIDINE 20 MG/1
1 TABLET AT BEDTIME AS NEEDED TABLET, FILM COATED ORAL ONCE A DAY
Status: ACTIVE | COMMUNITY

## 2021-08-09 NOTE — HPI-TODAY'S VISIT:
Patient is a 56-year-old male referred by Dr. Yonatan Silva May 2021 for evaluation of liver issue if the hep b core positive and on tx now for HBV prophylaxis for same due to isp.  A copy of the note will be sent to Dr. Yonatan Silva.  Last week did labs and not back and osito will check on these.  June 23 2021 labs show hepatitis B immunity so you are protected.  Hep B DNA not detected.  Glucose 92 BUN of 28 creatinine is elevated at 1.61 and has varied from 1.23 up to 1.53 before.  We need to look at your hydration status with these changes.  The summer heat has been kicking up lately.  Sodium 137 potassium 4.9 calcium 9.9 albumin 4.3 bilirubin 0.5 alkaline phosphatase 95 AST down to 13 ALT of 8 previously AST 29 ALT 17 so liver enzymes definitely lower.  White blood cell count 7 hemoglobin 14.5 platelet count 315 MCV 94 neutrophils 3.4.  Per notes on tenofovir df po qd and so still above 50. Egfr 55.  Having diarrhea at the time and he says that it is better.  He did the 2nd entyvio now about 2 weeks ago and says starting to do better now.  He says the diarrhea not there now and some hemorrhodis.  Dr Silva saw recently with Namrata and they see him again sept.  Next entyvio aug 23.  Aug 5 wbc 8.8 and hg 13.1 plat 318 and mcv 95 and neutrophils 4.4 and lymph 3.5 little up and glu 72 and cr 1.18 and na 140 and k 4.8 and cl 103 and c02 26 and alb 3.9 and tv 0.4 and alk 85 and ast and alt 13.  HBV pending.   Recap:  Patient was last seen back on Radha 3 by Namrata Pillai PA-C, for his noted  hx of history of ulcerative colitis left-sided first noted in 2011.  Previously followed by Dr. Rasmussen and few others . Also had seen Dr En Mattson as well prior.  Had been on Remicade 5 mg/kg every 8 weeks since 2011 and did not respond to oral treatment prior.  No known complications or surgeries noted.  Patient on the Remicade had been much better but never was in clinical remission and still with persistent joint pains.  Patient in the fall 2020 was noted  flaring and a Prometheus panel showed undetected Remicade levels and presence of antibodies.  He was transitioned to Humira in November 2020 on a every 2-week basis and is being followed for this. He says when getting higher dose doing beter and then more issues when lower.  During induction he felt well but with maintenance started had some increase in symptoms.  1 pen misfired in January.  Noted to have some hematochezia in January and then more increased bright red blood per rectum with bowel movements.  10 to 15 bms per day. Patient lost 7 pounds.  Patient started on prednisone now in June at 10 mg a day with some mild improvement during the day but still with nocturnal stools.   He was changed to budesonide 9mg a day and on that until 3rd dose.  Also to do a suppository and says had issues when had diarrhea and doing better now also.  BM post meals 4 a day and most solid now. occ runny. Prior more watery.  Next colon is not clear when Dr Silva planning in sept tp see him.  Colonoscopy 2019 with 1 cm transverse colon adenoma.  August 2020 colonoscopy mild inflammation up to 15 cm from anal verge biopsies showed chronic active proctitis.  Remaining colon normal.  He stopped herbals.  Only onprobiotic and only on culturelle a day. Prior pt listed as taking some herbals specifically a papaya enzyme extract, some glucosamine sulfate, aloe vera, omega-3 as well as a probiotic.  Also noted to be on allopurinol and colchicine for gout.  Weight noted to be 28.55 BMI.    Dec 2020 Liver enzymes show bilirubin 0.8 alkaline phosphatase 65 AST 29 ALT 17.  White cell count 5.7 hemoglobin 60.8 plate count 224.  Glucose 92 BUN of 17 creatinine 1.23 sodium 140 potassium 4.3.  Patient had hepatitis as well as quantiferon on Radha 3 and was indeterminant and to get cxr for tb. No symptoms from this.  October 2020 labs showed QuantiFERON gold plus was negative.  White cell count 5.7 hemoglobin 16.8 platelet count 224 back in December 2020 AST then 29 ALT 17 alk phos 65 total bilirubin 0.8 sodium 140 potassium 4.3 glucose 92 BUN of 17 creatinine 1.23.  Vitamin D level 86.6.  June 2021 labs show hep B surface antigen negative hep B surface antibody immune at 51.1. 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  He retired  and did shots and he does not remember having the hep b.  These meds on suppress immune system. Can reactivate the hep. 2011 to now not reactivated but never know and also havign some ab and not as immune suppressed and now on steroids and new med.  Typically need a medicine to prevent this as if does reactivate and it does not usually clear as most people stay on this.   Some meds follow and do labs 1-3 m and treat as needed if low risk but this class is high risk.  HBV prevention meds have risk and need to follow labs as they can affect  kidneys and bones can be issues.  He is also on steroids and if this persists then bone risk and the first line med for hep b tenofovir df may have more risk than tenofovir af that less bone and renal risk and have to consider,  Bone density not done and need one and we can can ask Namrata re this and if abn then we need to look at starting that cliff if to stay on steroids.  Older records: October 2015 hep B DNA not detected.  Hep B surface antigen negative.  He did the u.s at [facility] in june 2021 and we did not see that result.    Plan 1.  Need copy fo the June 2021 liver imaging to assess liver with a history of the B core positive. He did at [facility] but not in chart and osito will check on this. 2.  Labs in .  3. See in dec and see us same day of u.s then. 4.  Stay on the viread pending the labs showing any change.  Cautioned pt re the pandemic to use strict hand washing, wear mask even if the covid 19 vaccine is or has not been obtained, socially distance, and look to the cdc web page or announcements for  updates as this a moving target and lots of updates coming at us re the COVID 19.  Duration of the visit was 36 min with 5 min with 31 min via healow clock with time spent reviewing info with him.

## 2021-08-10 PROBLEM — 409063005 COUNSELING: Status: ACTIVE | Noted: 2021-06-06

## 2021-08-13 ENCOUNTER — TELEPHONE ENCOUNTER (OUTPATIENT)
Dept: URBAN - METROPOLITAN AREA CLINIC 92 | Facility: CLINIC | Age: 56
End: 2021-08-13

## 2021-08-13 LAB
A/G RATIO: 1.3
ALBUMIN: 3.9
ALKALINE PHOSPHATASE: 85
ALT (SGPT): 13
AST (SGOT): 13
BASO (ABSOLUTE): 0
BASOS: 1
BILIRUBIN, TOTAL: 0.4
BUN/CREATININE RATIO: 13
BUN: 15
CALCIUM: 9.8
CARBON DIOXIDE, TOTAL: 26
CHLORIDE: 103
CREATININE: 1.18
EGFR IF AFRICN AM: 80
EGFR IF NONAFRICN AM: 70
EOS (ABSOLUTE): 0.2
EOS: 3
GLOBULIN, TOTAL: 3.1
GLUCOSE: 72
HBV LOG10: (no result)
HEMATOCRIT: 38.9
HEMATOLOGY COMMENTS:: (no result)
HEMOGLOBIN: 13.1
HEPATITIS B QUANTITATION: (no result)
IMMATURE CELLS: (no result)
IMMATURE GRANS (ABS): 0
IMMATURE GRANULOCYTES: 1
LYMPHS (ABSOLUTE): 3.5
LYMPHS: 40
MCH: 32
MCHC: 33.7
MCV: 95
MONOCYTES(ABSOLUTE): 0.7
MONOCYTES: 8
NEUTROPHILS (ABSOLUTE): 4.4
NEUTROPHILS: 47
NRBC: (no result)
PLATELETS: 318
POTASSIUM: 4.8
PROTEIN, TOTAL: 7
RBC: 4.09
RDW: 12.2
SODIUM: 140
TEST INFORMATION:: (no result)
WBC: 8.8

## 2021-08-13 NOTE — HPI-TODAY'S VISIT:
Dear Jerzy Hardy, August 5 labs show white blood cell count 8.8 RBC count slightly low at 4.09 with normal from 4.14 up to 5.8. Previously you were normal at 4.74 and 5.5 but clearly lower now. Please share with primary provider. Hemoglobin lower also at 13.1 from previous 14.5 and prior 16.8. Hemoglobin currently at the lower side of normal. Platelet count 318. Neutrophils normal at 4.4 with lymphocytes up a little bit at 3.5. Were you ill recently? Glucose 72 BUN down to 15 from 28. Creatinine down to 1.18 from 1.61. Sodium 140 potassium 4.8 calcium 9.8 albumin 3.9 bilirubin 0.4 alkaline phosphatase 85 AST 13 ALT 13. Previously AST 13 and ALT of 8. Hep B DNA not detected. So labs for liver better and creatinine and suspect prior diarrhea and colitis issues had affected him and dropped the hg. He says not bleeding now and bowels better. Pt says also had a cold and so maybe explains the lymphocytes being little up. Spoke with pt. Dr Harrington

## 2021-08-16 ENCOUNTER — TELEPHONE ENCOUNTER (OUTPATIENT)
Dept: URBAN - METROPOLITAN AREA CLINIC 92 | Facility: CLINIC | Age: 56
End: 2021-08-16

## 2021-08-17 ENCOUNTER — TELEPHONE ENCOUNTER (OUTPATIENT)
Dept: URBAN - METROPOLITAN AREA CLINIC 92 | Facility: CLINIC | Age: 56
End: 2021-08-17

## 2021-08-18 ENCOUNTER — TELEPHONE ENCOUNTER (OUTPATIENT)
Dept: URBAN - METROPOLITAN AREA CLINIC 92 | Facility: CLINIC | Age: 56
End: 2021-08-18

## 2021-08-23 ENCOUNTER — OFFICE VISIT (OUTPATIENT)
Dept: URBAN - METROPOLITAN AREA CLINIC 97 | Facility: CLINIC | Age: 56
End: 2021-08-23
Payer: MEDICARE

## 2021-08-23 VITALS
HEART RATE: 84 BPM | SYSTOLIC BLOOD PRESSURE: 134 MMHG | WEIGHT: 195 LBS | RESPIRATION RATE: 17 BRPM | DIASTOLIC BLOOD PRESSURE: 82 MMHG | HEIGHT: 70 IN | TEMPERATURE: 98.5 F | BODY MASS INDEX: 27.92 KG/M2

## 2021-08-23 DIAGNOSIS — K51.80 CHRONIC PANCOLONIC ULCERATIVE COLITIS: ICD-10-CM

## 2021-08-23 PROCEDURE — 96365 THER/PROPH/DIAG IV INF INIT: CPT | Performed by: INTERNAL MEDICINE

## 2021-08-23 RX ORDER — TENOFOVIR DISOPROXIL FUMARATE 300 MG/1
TAKE ONE TABLET BY MOUTH DAILY TABLET, FILM COATED ORAL
Qty: 30 TABLET | Refills: 1 | Status: ACTIVE | COMMUNITY

## 2021-08-23 RX ORDER — MESALAMINE 1000 MG/1
1 SUPPOSITORY AT BEDTIME SUPPOSITORY RECTAL ONCE A DAY
Status: ACTIVE | COMMUNITY

## 2021-08-23 RX ORDER — FAMOTIDINE 20 MG/1
1 TABLET AT BEDTIME AS NEEDED TABLET, FILM COATED ORAL ONCE A DAY
Status: ACTIVE | COMMUNITY

## 2021-08-23 RX ORDER — VEDOLIZUMAB 300 MG/5ML
AS DIRECTED INJECTION, POWDER, LYOPHILIZED, FOR SOLUTION INTRAVENOUS
Status: ACTIVE | COMMUNITY

## 2021-08-23 RX ORDER — BUDESONIDE 3 MG/1
3 CAPSULES CAPSULE, COATED PELLETS ORAL ONCE A DAY
Status: ACTIVE | COMMUNITY

## 2021-08-23 RX ORDER — COLCHICINE 0.6 MG/1
1 TABLET TABLET, FILM COATED ORAL QD PRN
Refills: 0 | Status: ACTIVE | COMMUNITY
Start: 1900-01-01

## 2021-08-23 RX ORDER — TENOFOVIR DISPROXIL FUMARATE 300 MG/1
1 TABLET TABLET ORAL ONCE A DAY
Qty: 30 | Refills: 2 | Status: ACTIVE | COMMUNITY

## 2021-08-23 RX ORDER — LACTOBACILLUS RHAMNOSUS GG 10B CELL
AS DIRECTED CAPSULE ORAL
Status: ACTIVE | COMMUNITY

## 2021-08-23 RX ORDER — OLMESARTAN MEDOXOMIL 20 MG/1
1 TABLET TABLET ORAL ONCE A DAY
Status: ACTIVE | COMMUNITY

## 2021-08-30 ENCOUNTER — TELEPHONE ENCOUNTER (OUTPATIENT)
Dept: URBAN - METROPOLITAN AREA CLINIC 92 | Facility: CLINIC | Age: 56
End: 2021-08-30

## 2021-09-02 ENCOUNTER — OFFICE VISIT (OUTPATIENT)
Dept: URBAN - METROPOLITAN AREA CLINIC 92 | Facility: CLINIC | Age: 56
End: 2021-09-02

## 2021-09-28 ENCOUNTER — OFFICE VISIT (OUTPATIENT)
Dept: URBAN - METROPOLITAN AREA CLINIC 92 | Facility: CLINIC | Age: 56
End: 2021-09-28

## 2021-10-04 ENCOUNTER — OFFICE VISIT (OUTPATIENT)
Dept: URBAN - METROPOLITAN AREA CLINIC 92 | Facility: CLINIC | Age: 56
End: 2021-10-04
Payer: MEDICARE

## 2021-10-04 VITALS
TEMPERATURE: 98 F | BODY MASS INDEX: 27.35 KG/M2 | DIASTOLIC BLOOD PRESSURE: 87 MMHG | HEIGHT: 70 IN | HEART RATE: 88 BPM | WEIGHT: 191 LBS | SYSTOLIC BLOOD PRESSURE: 134 MMHG

## 2021-10-04 DIAGNOSIS — K51.00 ULCERATIVE PANCOLITIS WITHOUT COMPLICATION: ICD-10-CM

## 2021-10-04 PROCEDURE — 99213 OFFICE O/P EST LOW 20 MIN: CPT | Performed by: INTERNAL MEDICINE

## 2021-10-04 RX ORDER — VEDOLIZUMAB 300 MG/5ML
AS DIRECTED INJECTION, POWDER, LYOPHILIZED, FOR SOLUTION INTRAVENOUS
Status: ACTIVE | COMMUNITY

## 2021-10-04 RX ORDER — LACTOBACILLUS RHAMNOSUS GG 10B CELL
AS DIRECTED CAPSULE ORAL
Status: ACTIVE | COMMUNITY

## 2021-10-04 RX ORDER — OLMESARTAN MEDOXOMIL 20 MG/1
1 TABLET TABLET ORAL ONCE A DAY
Status: ACTIVE | COMMUNITY

## 2021-10-04 RX ORDER — FAMOTIDINE 20 MG/1
1 TABLET AT BEDTIME AS NEEDED TABLET, FILM COATED ORAL ONCE A DAY
Status: ACTIVE | COMMUNITY

## 2021-10-04 RX ORDER — COLCHICINE 0.6 MG/1
1 TABLET TABLET, FILM COATED ORAL QD PRN
Refills: 0 | Status: ACTIVE | COMMUNITY
Start: 1900-01-01

## 2021-10-04 RX ORDER — TENOFOVIR DISPROXIL FUMARATE 300 MG/1
1 TABLET TABLET ORAL ONCE A DAY
Qty: 30 | Refills: 2 | Status: ACTIVE | COMMUNITY

## 2021-10-04 NOTE — HPI-TODAY'S VISIT:
Pt with hx of UC , left sided dx in 2011. Previously followed by Dr. Osorio. Had been on Remicade 5mg/lg q8 weeks since 2011 however flaring in the fall of 2020 and Prometheus panel showed undetectable remicade levels and presence of antibodies. He was transitioned to Humira in 11/2020, on Q2W but symptoms persisted Drug levels 1.7 + detectable AB.  Transitioned to Entyvio and now completely asymptomatic. Having one BM per day. No joint pains or rashes. On pred 5mg but completes taper this week.  Quant indeterminate. CXR no signs of TB.  Follows with Dr. Harrington/Liver clinic for Hep B core ab + on viread.  Colonoscopy in 2019 with 1cm transverse colon adenoma. No bx obtained around polyp base. Repeat colonoscopy 8/2020 with mild inflammation upto 15cm from anal verge. Bx + chronic active proctitis Remaining

## 2021-10-18 ENCOUNTER — OFFICE VISIT (OUTPATIENT)
Dept: URBAN - METROPOLITAN AREA CLINIC 97 | Facility: CLINIC | Age: 56
End: 2021-10-18
Payer: MEDICARE

## 2021-10-18 VITALS
HEIGHT: 70 IN | WEIGHT: 188 LBS | BODY MASS INDEX: 26.92 KG/M2 | DIASTOLIC BLOOD PRESSURE: 76 MMHG | TEMPERATURE: 97.3 F | SYSTOLIC BLOOD PRESSURE: 125 MMHG | HEART RATE: 96 BPM | RESPIRATION RATE: 18 BRPM

## 2021-10-18 DIAGNOSIS — K51.80 CHRONIC PANCOLONIC ULCERATIVE COLITIS: ICD-10-CM

## 2021-10-18 PROCEDURE — 96413 CHEMO IV INFUSION 1 HR: CPT | Performed by: INTERNAL MEDICINE

## 2021-10-18 RX ORDER — VEDOLIZUMAB 300 MG/5ML
AS DIRECTED INJECTION, POWDER, LYOPHILIZED, FOR SOLUTION INTRAVENOUS
Status: ACTIVE | COMMUNITY

## 2021-10-18 RX ORDER — LACTOBACILLUS RHAMNOSUS GG 10B CELL
AS DIRECTED CAPSULE ORAL
Status: ACTIVE | COMMUNITY

## 2021-10-18 RX ORDER — FAMOTIDINE 20 MG/1
1 TABLET AT BEDTIME AS NEEDED TABLET, FILM COATED ORAL ONCE A DAY
Status: ACTIVE | COMMUNITY

## 2021-10-18 RX ORDER — TENOFOVIR DISPROXIL FUMARATE 300 MG/1
1 TABLET TABLET ORAL ONCE A DAY
Qty: 30 | Refills: 2 | Status: ACTIVE | COMMUNITY

## 2021-10-18 RX ORDER — OLMESARTAN MEDOXOMIL 20 MG/1
1 TABLET TABLET ORAL ONCE A DAY
Status: ACTIVE | COMMUNITY

## 2021-10-18 RX ORDER — COLCHICINE 0.6 MG/1
1 TABLET TABLET, FILM COATED ORAL QD PRN
Refills: 0 | Status: ACTIVE | COMMUNITY
Start: 1900-01-01

## 2021-11-08 ENCOUNTER — LAB OUTSIDE AN ENCOUNTER (OUTPATIENT)
Dept: URBAN - METROPOLITAN AREA TELEHEALTH 2 | Facility: TELEHEALTH | Age: 56
End: 2021-11-08

## 2021-11-15 ENCOUNTER — ERX REFILL RESPONSE (OUTPATIENT)
Dept: URBAN - METROPOLITAN AREA CLINIC 86 | Facility: CLINIC | Age: 56
End: 2021-11-15

## 2021-11-15 RX ORDER — TENOFOVIR DISPROXIL FUMARATE 300 MG/1
1 TABLET TABLET ORAL ONCE A DAY
Qty: 30 | Refills: 2 | OUTPATIENT

## 2021-11-15 RX ORDER — TENOFOVIR DISOPROXIL FUMARATE 300 MG/1
TAKE ONE TABLET BY MOUTH DAILY TABLET, FILM COATED ORAL
Qty: 30 TABLET | Refills: 1 | OUTPATIENT

## 2021-11-27 ENCOUNTER — TELEPHONE ENCOUNTER (OUTPATIENT)
Dept: URBAN - METROPOLITAN AREA CLINIC 92 | Facility: CLINIC | Age: 56
End: 2021-11-27

## 2021-11-27 LAB
A/G RATIO: 1.5
ALBUMIN: 4.4
ALKALINE PHOSPHATASE: 80
ALT (SGPT): 10
AST (SGOT): 17
BASO (ABSOLUTE): 0
BASOS: 0
BILIRUBIN, TOTAL: 0.7
BUN/CREATININE RATIO: 8
BUN: 10
CALCIUM: 9.9
CARBON DIOXIDE, TOTAL: 26
CHLORIDE: 100
CREATININE: 1.22
EGFR IF AFRICN AM: 77
EGFR IF NONAFRICN AM: 67
EOS (ABSOLUTE): 0.1
EOS: 2
GLOBULIN, TOTAL: 3
GLUCOSE: 99
HBV LOG10: (no result)
HEMATOCRIT: 44.7
HEMATOLOGY COMMENTS:: (no result)
HEMOGLOBIN: 14.7
HEPATITIS B QUANTITATION: (no result)
HEPATITIS B SURF AB QUANT: 35
IMMATURE CELLS: (no result)
IMMATURE GRANS (ABS): 0
IMMATURE GRANULOCYTES: 0
LYMPHS (ABSOLUTE): 2.4
LYMPHS: 35
MCH: 27.5
MCHC: 32.9
MCV: 84
MONOCYTES(ABSOLUTE): 0.5
MONOCYTES: 8
NEUTROPHILS (ABSOLUTE): 3.7
NEUTROPHILS: 55
NRBC: (no result)
PLATELETS: 236
POTASSIUM: 4.4
PROTEIN, TOTAL: 7.4
RBC: 5.35
RDW: 14.3
SODIUM: 136
TEST INFORMATION:: (no result)
WBC: 6.8

## 2021-11-27 NOTE — HPI-TODAY'S VISIT:
Dear Jerzy Hardy, Nov 2021 labs: Hepatitis B surface antibody remains immune but it is slightly lower on the titer/level at 35 rather than 51.1 prior.  If drops to not detected, could give a booster dose of the hepatitis b vaccine for an amnestic response. Hep B quantitative DNA remains not detected. Glucose 99 BUN of 10 creatinine 1.22 and previously was 1.18 and prior to that 1.61.  It is currently in normal range at 1.22. Sodium 136 potassium 4.4 albumin 4.4 and calcium 9.9 total bilirubin 0.7 alkaline phosphatase 80 AST 17 and ALT 10 with ideal ALT less than 35 so doing well there. White blood cell count 6.8 hemoglobin 14.7 which is up from 13.1.  Platelet count normal at 236.  MCV normal at 84.  Neutrophils 3.7 and lymphocytes normal at 2.4. Dr. Harrington

## 2021-12-02 ENCOUNTER — LAB OUTSIDE AN ENCOUNTER (OUTPATIENT)
Dept: URBAN - METROPOLITAN AREA TELEHEALTH 2 | Facility: TELEHEALTH | Age: 56
End: 2021-12-02

## 2021-12-06 ENCOUNTER — TELEPHONE ENCOUNTER (OUTPATIENT)
Dept: URBAN - METROPOLITAN AREA CLINIC 92 | Facility: CLINIC | Age: 56
End: 2021-12-06

## 2021-12-08 ENCOUNTER — LAB OUTSIDE AN ENCOUNTER (OUTPATIENT)
Dept: URBAN - METROPOLITAN AREA CLINIC 105 | Facility: CLINIC | Age: 56
End: 2021-12-08

## 2021-12-08 ENCOUNTER — OFFICE VISIT (OUTPATIENT)
Dept: URBAN - METROPOLITAN AREA CLINIC 92 | Facility: CLINIC | Age: 56
End: 2021-12-08
Payer: MEDICARE

## 2021-12-08 ENCOUNTER — TELEPHONE ENCOUNTER (OUTPATIENT)
Dept: URBAN - METROPOLITAN AREA CLINIC 92 | Facility: CLINIC | Age: 56
End: 2021-12-08

## 2021-12-08 ENCOUNTER — TELEPHONE ENCOUNTER (OUTPATIENT)
Dept: URBAN - METROPOLITAN AREA CLINIC 13 | Facility: CLINIC | Age: 56
End: 2021-12-08

## 2021-12-08 VITALS
BODY MASS INDEX: 26.86 KG/M2 | DIASTOLIC BLOOD PRESSURE: 70 MMHG | SYSTOLIC BLOOD PRESSURE: 114 MMHG | HEIGHT: 70 IN | HEART RATE: 80 BPM | TEMPERATURE: 97.6 F | WEIGHT: 187.6 LBS

## 2021-12-08 DIAGNOSIS — R19.7 DIARRHEA OF PRESUMED INFECTIOUS ORIGIN: ICD-10-CM

## 2021-12-08 DIAGNOSIS — K51.00 ULCERATIVE PANCOLITIS WITHOUT COMPLICATION: ICD-10-CM

## 2021-12-08 PROCEDURE — 99214 OFFICE O/P EST MOD 30 MIN: CPT | Performed by: PHYSICIAN ASSISTANT

## 2021-12-08 RX ORDER — FAMOTIDINE 20 MG/1
1 TABLET AT BEDTIME AS NEEDED TABLET, FILM COATED ORAL ONCE A DAY
Status: ACTIVE | COMMUNITY

## 2021-12-08 RX ORDER — LACTOBACILLUS RHAMNOSUS GG 10B CELL
AS DIRECTED CAPSULE ORAL
Status: ACTIVE | COMMUNITY

## 2021-12-08 RX ORDER — OLMESARTAN MEDOXOMIL 20 MG/1
1 TABLET TABLET ORAL ONCE A DAY
Status: ACTIVE | COMMUNITY

## 2021-12-08 RX ORDER — VEDOLIZUMAB 300 MG/5ML
AS DIRECTED INJECTION, POWDER, LYOPHILIZED, FOR SOLUTION INTRAVENOUS
Status: ACTIVE | COMMUNITY

## 2021-12-08 RX ORDER — COLCHICINE 0.6 MG/1
1 TABLET TABLET, FILM COATED ORAL QD PRN
Refills: 0 | Status: ACTIVE | COMMUNITY
Start: 1900-01-01

## 2021-12-08 RX ORDER — TENOFOVIR DISOPROXIL FUMARATE 300 MG/1
TAKE ONE TABLET BY MOUTH DAILY TABLET, FILM COATED ORAL
Qty: 30 TABLET | Refills: 1 | Status: ACTIVE | COMMUNITY

## 2021-12-08 NOTE — HPI-TODAY'S VISIT:
56yoM with hx of UC , left sided dx in 2011. Previously followed by Dr. Osorio. Had been on Remicade 5mg/lg q8 weeks since 2011 however flaring in the fall of 2020 and Prometheus panel showed undetectable    remicade levels and presence of antibodies. He was transitioned to Humira in 11/2020, on Q2W but symptoms persisted Drug levels 1.7 + detectable AB.   Transitioned to Entyvio in July (1st maintenance in October) and was asymptomatic at October OV with BM once/day but was on 5mg of prednisone taper at that time. Now notes 1m of increased sx, worse X 3 weeks. Now with BMs 5-10/day including nocturnal stools. Diarrhea improves with fasting. No bleeding. Notes severe odor to stool X1m. He was on amoxicillin twice, once for URI 2m ago and once 3m ago.  No joint pains or rashes.   Quant indeterminate. CXR no signs of TB. Follows with Dr. Harrington/Liver clinic for Hep B core ab + on viread.  Colonoscopy in 2019 with 1cm transverse colon adenoma. No bx obtained around polyp base. Repeat colonoscopy 8/2020 with mild inflammation upto 15cm from anal verge. Bx + chronic active proctitis Remaining

## 2021-12-08 NOTE — PHYSICAL EXAM NECK/THYROID:
normal appearance , without tenderness upon palpation , no deformities , trachea midline , Thyroid normal size today

## 2021-12-09 LAB
BASO (ABSOLUTE): 0
BASOS: 0
EOS (ABSOLUTE): 0.1
EOS: 1
HEMATOCRIT: 43.2
HEMATOLOGY COMMENTS:: (no result)
HEMOGLOBIN: 14.5
IMMATURE CELLS: (no result)
IMMATURE GRANS (ABS): 0
IMMATURE GRANULOCYTES: 1
LYMPHS (ABSOLUTE): 1.4
LYMPHS: 22
MCH: 28
MCHC: 33.6
MCV: 83
MONOCYTES(ABSOLUTE): 0.5
MONOCYTES: 7
NEUTROPHILS (ABSOLUTE): 4.5
NEUTROPHILS: 69
NRBC: (no result)
PLATELETS: 326
RBC: 5.18
RDW: 15.8
WBC: 6.6

## 2021-12-11 PROBLEM — 736687002 HEPATITIS B CORE ANTIBODY POSITIVE: Status: ACTIVE | Noted: 2021-06-06

## 2021-12-12 LAB
ADENOVIRUS F 40/41: NOT DETECTED
ASTROVIRUS: NOT DETECTED
C DIFFICILE TOXIN A/B: NOT DETECTED
CAMPYLOBACTER: NOT DETECTED
CRYPTOSPORIDIUM: NOT DETECTED
CYCLOSPORA CAYETANENSIS: NOT DETECTED
E COLI O157: (no result)
ENTAMOEBA HISTOLYTICA: NOT DETECTED
ENTEROAGGREGATIVE E COLI: NOT DETECTED
ENTEROPATHOGENIC E COLI: NOT DETECTED
ENTEROTOXIGENIC E COLI: NOT DETECTED
GIARDIA LAMBLIA: NOT DETECTED
NOROVIRUS GI/GII: NOT DETECTED
PLESIOMONAS SHIGELLOIDES: NOT DETECTED
ROTAVIRUS A: NOT DETECTED
SALMONELLA: NOT DETECTED
SAPOVIRUS: NOT DETECTED
SHIGA-TOXIN-PRODUCING E COLI: NOT DETECTED
SHIGELLA/ENTEROINVASIVE E COLI: NOT DETECTED
VIBRIO CHOLERAE: NOT DETECTED
VIBRIO: NOT DETECTED
YERSINIA ENTEROCOLITICA: NOT DETECTED

## 2021-12-13 ENCOUNTER — OFFICE VISIT (OUTPATIENT)
Dept: URBAN - METROPOLITAN AREA CLINIC 97 | Facility: CLINIC | Age: 56
End: 2021-12-13

## 2021-12-13 ENCOUNTER — TELEPHONE ENCOUNTER (OUTPATIENT)
Dept: URBAN - METROPOLITAN AREA CLINIC 92 | Facility: CLINIC | Age: 56
End: 2021-12-13

## 2021-12-14 ENCOUNTER — OFFICE VISIT (OUTPATIENT)
Dept: URBAN - METROPOLITAN AREA CLINIC 86 | Facility: CLINIC | Age: 56
End: 2021-12-14

## 2021-12-14 ENCOUNTER — TELEPHONE ENCOUNTER (OUTPATIENT)
Dept: URBAN - METROPOLITAN AREA CLINIC 98 | Facility: CLINIC | Age: 56
End: 2021-12-14

## 2021-12-14 ENCOUNTER — OFFICE VISIT (OUTPATIENT)
Dept: URBAN - METROPOLITAN AREA CLINIC 97 | Facility: CLINIC | Age: 56
End: 2021-12-14
Payer: MEDICARE

## 2021-12-14 DIAGNOSIS — K51.80 CHRONIC PANCOLONIC ULCERATIVE COLITIS: ICD-10-CM

## 2021-12-14 PROCEDURE — 96413 CHEMO IV INFUSION 1 HR: CPT | Performed by: INTERNAL MEDICINE

## 2021-12-14 RX ORDER — VEDOLIZUMAB 300 MG/5ML
AS DIRECTED INJECTION, POWDER, LYOPHILIZED, FOR SOLUTION INTRAVENOUS
Status: ACTIVE | COMMUNITY

## 2021-12-14 RX ORDER — COLCHICINE 0.6 MG/1
1 TABLET TABLET, FILM COATED ORAL QD PRN
Refills: 0 | Status: ACTIVE | COMMUNITY
Start: 1900-01-01

## 2021-12-14 RX ORDER — LACTOBACILLUS RHAMNOSUS GG 10B CELL
AS DIRECTED CAPSULE ORAL
Status: ACTIVE | COMMUNITY

## 2021-12-14 RX ORDER — TENOFOVIR DISOPROXIL FUMARATE 300 MG/1
TAKE ONE TABLET BY MOUTH DAILY TABLET, FILM COATED ORAL
Qty: 30 TABLET | Refills: 1 | Status: ACTIVE | COMMUNITY

## 2021-12-14 RX ORDER — OLMESARTAN MEDOXOMIL 20 MG/1
1 TABLET TABLET ORAL ONCE A DAY
Status: ACTIVE | COMMUNITY

## 2021-12-14 RX ORDER — FAMOTIDINE 20 MG/1
1 TABLET AT BEDTIME AS NEEDED TABLET, FILM COATED ORAL ONCE A DAY
Status: ACTIVE | COMMUNITY

## 2021-12-14 RX ORDER — TENOFOVIR DISPROXIL FUMARATE 300 MG/1
1 TABLET TABLET ORAL ONCE A DAY
Qty: 30 | Refills: 2 | OUTPATIENT

## 2021-12-14 NOTE — HPI-TODAY'S VISIT:
Patient is a 56-year-old male referred by Dr. Yonatan Silva Aug  2021 for evaluation of liver issue if the hep b core positive and on tx now for HBV prophylaxis for same due to isp.  A copy of the note will be sent to Dr. Yonatan Silva.  Last week did labs and not back and osito will check on these.  Dear Jerzy Hardy, Nov 2021 labs: Hepatitis B surface antibody remains immune but it is slightly lower on the titer/level at 35 rather than 51.1 prior.  If drops to not detected, could give a booster dose of the hepatitis b vaccine for an amnestic response. Hep B quantitative DNA remains not detected. Glucose 99 BUN of 10 creatinine 1.22 and previously was 1.18 and prior to that 1.61.  It is currently in normal range at 1.22. Sodium 136 potassium 4.4 albumin 4.4 and calcium 9.9 total bilirubin 0.7 alkaline phosphatase 80 AST 17 and ALT 10 with ideal ALT less than 35 so doing well there. White blood cell count 6.8 hemoglobin 14.7 which is up from 13.1.  Platelet count normal at 236.  MCV normal at 84.  Neutrophils 3.7 and lymphocytes normal at 2.4. Dr. Harrington Dear Jerzy Hardy, August 5 labs show white blood cell count 8.8 RBC count slightly low at 4.09 with normal from 4.14 up to 5.8. Previously you were normal at 4.74 and 5.5 but clearly lower now. Please share with primary provider. Hemoglobin lower also at 13.1 from previous 14.5 and prior 16.8. Hemoglobin currently at the lower side of normal. Platelet count 318. Neutrophils normal at 4.4 with lymphocytes up a little bit at 3.5. Were you ill recently? Glucose 72 BUN down to 15 from 28. Creatinine down to 1.18 from 1.61. Sodium 140 potassium 4.8 calcium 9.8 albumin 3.9 bilirubin 0.4 alkaline phosphatase 85 AST 13 ALT 13. Previously AST 13 and ALT of 8. Hep B DNA not detected. So labs for liver better and creatinine and suspect prior diarrhea and colitis issues had affected him and dropped the hg. He says not bleeding now and bowels better. Pt says also had a cold and so maybe explains the lymphocytes being little up. Spoke with pt. Dr Harrington   June 23 2021 labs show hepatitis B immunity so you are protected. Hep B DNA not detected. Glucose 92 BUN of 28 creatinine is elevated at 1.61 and has varied from 1.23 up to 1.53 before. We need to look at your hydration status with these changes. The summer heat has been kicking up lately. Sodium 137 potassium 4.9 calcium 9.9 albumin 4.3 bilirubin 0.5 alkaline phosphatase 95 AST down to 13 ALT of 8 previously AST 29 ALT 17 so liver enzymes definitely lower. White blood cell count 7 hemoglobin 14.5 platelet count 315 MCV 94 neutrophils 3.4. Per notes on tenofovir df po qd and so still above 50. Egfr 55. Having diarrhea at the time and he says that it is better.  He did the 2nd entyvio now about 2 weeks ago and says starting to do better now.  He says the diarrhea not there now and some hemorrhodis.  Dr Silva saw recently with Namrata and they see him again sept. Did entyvio aug 23.  Aug 5 wbc 8.8 and hg 13.1 plat 318 and mcv 95 and neutrophils 4.4 and lymph 3.5 little up and glu 72 and cr 1.18 and na 140 and k 4.8 and cl 103 and c02 26 and alb 3.9 and tv 0.4 and alk 85 and ast and alt 13. HBV pending.  Recap: Patient was last seen back on Radha 3 by Namrata Pillai PA-C, for his noted hx of history of ulcerative colitis left-sided first noted in 2011. Previously followed by Dr. Rasmussen and few others . Also had seen Dr En Mattson as well prior.  Had been on Remicade 5 mg/kg every 8 weeks since 2011 and did not respond to oral treatment prior. No known complications or surgeries noted.  Patient on the Remicade had been much better but never was in clinical remission and still with persistent joint pains.  Patient in the fall 2020 was noted flaring and a Prometheus panel showed undetected Remicade levels and presence of antibodies.  He was transitioned to Humira in November 2020 on a every 2-week basis and is being followed for this. He says when getting higher dose doing beter and then more issues when lower.  During induction he felt well but with maintenance started had some increase in symptoms. 1 pen misfired in January. Noted to have some hematochezia in January and then more increased bright red blood per rectum with bowel movements. 10 to 15 bms per day. Patient lost 7 pounds.  Patient started on prednisone in June at 10 mg a day with some mild improvement during the day but still with nocturnal stools.  He was changed to budesonide 9mg a day and on that until 3rd dose. Also to do a suppository and says had issues when had diarrhea and doing better now also.  BM post meals 4 a day and most solid now. occ runny. Prior more watery.  Colonoscopy 2019 with 1 cm transverse colon adenoma.  August 2020 colonoscopy mild inflammation up to 15 cm from anal verge biopsies showed chronic active proctitis. Remaining colon normal. He stopped herbals. Only onprobiotic and only on culturelle a day.  Prior pt listed as taking some herbals specifically a papaya enzyme extract, some glucosamine sulfate, aloe vera, omega-3 as well as a probiotic. Also noted to be on allopurinol and colchicine for gout.  Weight noted to be 28.55 BMI.  Dec 2020 Liver enzymes show bilirubin 0.8 alkaline phosphatase 65 AST 29 ALT 17. White cell count 5.7 hemoglobin 60.8 plate count 224. Glucose 92 BUN of 17 creatinine 1.23 sodium 140 potassium 4.3. Patient had hepatitis as well as quantiferon on Radha 3 and was indeterminant and to get cxr for tb. No symptoms from this. October 2020 labs showed QuantiFERON gold plus was negative. White cell count 5.7 hemoglobin 16.8 platelet count 224 back in December 2020 AST then 29 ALT 17 alk phos 65 total bilirubin 0.8 sodium 140 potassium 4.3 glucose 92 BUN of 17 creatinine 1.23. Vitamin D level 86.6. June 2021 labs show hep B surface antigen negative hep B surface antibody immune at 51.1.  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  He retired  and did shots and he does not remember having the hep b.  These meds on suppress immune system. Can reactivate the hep. 2011 to now not reactivated but never know and also havign some ab and not as immune suppressed and now on steroids and new med.  Typically need a medicine to prevent this as if does reactivate and it does not usually clear as most people stay on this.  Some meds follow and do labs 1-3 m and treat as needed if low risk but this class is high risk.  HBV prevention meds have risk and need to follow labs as they can affect kidneys and bones can be issues.  He is also on steroids and if this persists then bone risk and the first line med for hep b tenofovir df may have more risk than tenofovir af that less bone and renal risk and have to consider,  Bone density not done and need one and we can can ask Namrata re this and if abn then we need to look at starting that cliff if to stay on steroids.  Older records: October 2015 hep B DNA not detected. Hep B surface antigen negative. He did the u.s at [facility] in june 2021 and we did not see that result.  Plan 1. Need copy fo the June 2021 liver imaging to assess liver with a history of the B core positive. He did at [facility] but not in chart and osito will check on this. 2. Labs in . 3. See in dec and see us same day of u.s then. 4. Stay on the viread pending the labs showing any change.  Cautioned pt re the pandemic to use strict hand washing, wear mask even if the covid 19 vaccine is or has not been obtained, socially distance, and look to the cdc web page or announcements for updates as this a moving target and lots of updates coming at us re the COVID 19.  Duration of the visit was min with 5 min with 31 min via healow clock with time spent reviewing info with him.

## 2021-12-21 ENCOUNTER — TELEPHONE ENCOUNTER (OUTPATIENT)
Dept: URBAN - METROPOLITAN AREA CLINIC 92 | Facility: CLINIC | Age: 56
End: 2021-12-21

## 2021-12-21 RX ORDER — VANCOMYCIN HYDROCHLORIDE 250 MG/1
AS DIRECTED CAPSULE ORAL
Qty: 40 | Refills: 0 | OUTPATIENT
Start: 2021-12-21 | End: 2021-12-31

## 2021-12-23 ENCOUNTER — ERX REFILL RESPONSE (OUTPATIENT)
Dept: URBAN - METROPOLITAN AREA CLINIC 86 | Facility: CLINIC | Age: 56
End: 2021-12-23

## 2021-12-23 RX ORDER — TENOFOVIR DISOPROXIL FUMARATE 300 MG/1
TAKE ONE TABLET BY MOUTH DAILY TABLET, FILM COATED ORAL
Qty: 30 TABLET | Refills: 1 | OUTPATIENT

## 2021-12-23 RX ORDER — TENOFOVIR DISOPROXIL FUMARATE 300 MG/1
TAKE 1 TABLET BY MOUTH ONCE DAILY TABLET, FILM COATED ORAL
Qty: 30 TABLET | Refills: 1 | OUTPATIENT

## 2021-12-30 PROBLEM — 64766004 ULCERATIVE COLITIS: Status: ACTIVE | Noted: 2021-12-30

## 2022-01-05 ENCOUNTER — OFFICE VISIT (OUTPATIENT)
Dept: URBAN - METROPOLITAN AREA CLINIC 92 | Facility: CLINIC | Age: 57
End: 2022-01-05
Payer: MEDICARE

## 2022-01-05 ENCOUNTER — OUT OF OFFICE VISIT (OUTPATIENT)
Dept: URBAN - METROPOLITAN AREA MEDICAL CENTER 12 | Facility: MEDICAL CENTER | Age: 57
End: 2022-01-05
Payer: MEDICARE

## 2022-01-05 VITALS
SYSTOLIC BLOOD PRESSURE: 142 MMHG | TEMPERATURE: 98.1 F | HEART RATE: 71 BPM | HEIGHT: 70 IN | DIASTOLIC BLOOD PRESSURE: 86 MMHG | BODY MASS INDEX: 25.05 KG/M2 | WEIGHT: 175 LBS

## 2022-01-05 DIAGNOSIS — R19.7 ACUTE DIARRHEA: ICD-10-CM

## 2022-01-05 DIAGNOSIS — K51.00 ULCERATIVE PANCOLITIS WITHOUT COMPLICATION: ICD-10-CM

## 2022-01-05 DIAGNOSIS — R63.4 ABNORMAL INTENTIONAL WEIGHT LOSS: ICD-10-CM

## 2022-01-05 DIAGNOSIS — K51.818 OTHER ULCERATIVE COLITIS WITH OTHER COMPLICATION: ICD-10-CM

## 2022-01-05 DIAGNOSIS — R19.7 DIARRHEA OF PRESUMED INFECTIOUS ORIGIN: ICD-10-CM

## 2022-01-05 PROCEDURE — 99222 1ST HOSP IP/OBS MODERATE 55: CPT | Performed by: INTERNAL MEDICINE

## 2022-01-05 PROCEDURE — 99213 OFFICE O/P EST LOW 20 MIN: CPT | Performed by: PHYSICIAN ASSISTANT

## 2022-01-05 PROCEDURE — G8427 DOCREV CUR MEDS BY ELIG CLIN: HCPCS | Performed by: INTERNAL MEDICINE

## 2022-01-05 PROCEDURE — 99232 SBSQ HOSP IP/OBS MODERATE 35: CPT | Performed by: INTERNAL MEDICINE

## 2022-01-05 RX ORDER — VEDOLIZUMAB 300 MG/5ML
AS DIRECTED INJECTION, POWDER, LYOPHILIZED, FOR SOLUTION INTRAVENOUS
Status: ON HOLD | COMMUNITY

## 2022-01-05 RX ORDER — COLCHICINE 0.6 MG/1
1 TABLET TABLET, FILM COATED ORAL QD PRN
Refills: 0 | Status: ON HOLD | COMMUNITY
Start: 1900-01-01

## 2022-01-05 RX ORDER — OLMESARTAN MEDOXOMIL 20 MG/1
1 TABLET TABLET ORAL ONCE A DAY
Status: ON HOLD | COMMUNITY

## 2022-01-05 RX ORDER — PREDNISOLONE 5 MG/1
1 TABLET IN THE MORNING WITH FOOD OR MILK TABLET ORAL ONCE A DAY
Status: ACTIVE | COMMUNITY

## 2022-01-05 RX ORDER — LACTOBACILLUS RHAMNOSUS GG 10B CELL
AS DIRECTED CAPSULE ORAL
Status: ON HOLD | COMMUNITY

## 2022-01-05 RX ORDER — TENOFOVIR DISOPROXIL FUMARATE 300 MG/1
TAKE 1 TABLET BY MOUTH ONCE DAILY TABLET, FILM COATED ORAL
Qty: 30 TABLET | Refills: 1 | Status: ON HOLD | COMMUNITY

## 2022-01-05 RX ORDER — FAMOTIDINE 20 MG/1
1 TABLET AT BEDTIME AS NEEDED TABLET, FILM COATED ORAL ONCE A DAY
Status: ON HOLD | COMMUNITY

## 2022-01-05 RX ORDER — ALPRAZOLAM 1 MG/1
1 TABLET TABLET ORAL TWICE A DAY
Status: ACTIVE | COMMUNITY

## 2022-01-05 NOTE — HPI-TODAY'S VISIT:
56yoM with hx of UC , left sided dx in 2011. Previously followed by Dr. Osorio. Had been on Remicade 5mg/kg q8 weeks since 2011 however flaring in the fall of 2020 and Prometheus panel showed undetectable    remicade levels and presence of antibodies. He was transitioned to Humira in 11/2020, on Q2W but symptoms persisted Drug levels 1.7 + detectable AB.   Transitioned to Entyvio in July (1st maintenance in October) and was asymptomatic at October OV with BM once/day but was on 5mg of prednisone taper at that time. Then seen in Dec with 1m of increased sx, worse X 3 weeks: BMs 5-10/day including nocturnal stools. No bleeding. Noted severe odor to stool X1m. He was on amoxicillin twice prior to this for URI. C diff neg but given emperic vancomycin last month. Odor resolved with vanc but despite this and another Entyvio infusion still with diarrhea 6-7/day with urgency and nocturnal stools. Better if he does not eat but has lost 11#. Associated borborygmi but no pain. Also self dosing prednisone 20 or 30mg/day X 4-5 weeks.   Quant indeterminate. CXR no signs of TB. Follows with Dr. Harrington/Liver clinic for Hep B core ab + on viread.  Colonoscopy in 2019 with 1cm transverse colon adenoma. No bx obtained around polyp base. Repeat colonoscopy 8/2020 with morderate inflammation upto 15cm from anal verge. Bx + chronic active proctitis Remaining

## 2022-01-06 ENCOUNTER — OUT OF OFFICE VISIT (OUTPATIENT)
Dept: URBAN - METROPOLITAN AREA MEDICAL CENTER 12 | Facility: MEDICAL CENTER | Age: 57
End: 2022-01-06
Payer: MEDICARE

## 2022-01-06 DIAGNOSIS — R63.4 ABNORMAL INTENTIONAL WEIGHT LOSS: ICD-10-CM

## 2022-01-06 DIAGNOSIS — D50.8 ACQUIRED IRON DEFICIENCY ANEMIA DUE TO DECREASED ABSORPTION: ICD-10-CM

## 2022-01-06 DIAGNOSIS — R19.7 ACUTE DIARRHEA: ICD-10-CM

## 2022-01-06 DIAGNOSIS — K51.818 OTHER ULCERATIVE COLITIS WITH OTHER COMPLICATION: ICD-10-CM

## 2022-01-06 PROCEDURE — 45380 COLONOSCOPY AND BIOPSY: CPT | Performed by: INTERNAL MEDICINE

## 2022-01-06 PROCEDURE — 99232 SBSQ HOSP IP/OBS MODERATE 35: CPT | Performed by: INTERNAL MEDICINE

## 2022-01-09 ENCOUNTER — TELEPHONE ENCOUNTER (OUTPATIENT)
Dept: URBAN - METROPOLITAN AREA CLINIC 92 | Facility: CLINIC | Age: 57
End: 2022-01-09

## 2022-01-09 RX ORDER — COLCHICINE 0.6 MG/1
1 TABLET TABLET, FILM COATED ORAL QD PRN
Refills: 0 | Status: ON HOLD | COMMUNITY
Start: 1900-01-01

## 2022-01-09 RX ORDER — FAMOTIDINE 20 MG/1
1 TABLET AT BEDTIME AS NEEDED TABLET, FILM COATED ORAL ONCE A DAY
Status: ON HOLD | COMMUNITY

## 2022-01-09 RX ORDER — ALPRAZOLAM 1 MG/1
1 TABLET TABLET ORAL TWICE A DAY
Status: ACTIVE | COMMUNITY

## 2022-01-09 RX ORDER — VEDOLIZUMAB 300 MG/5ML
AS DIRECTED INJECTION, POWDER, LYOPHILIZED, FOR SOLUTION INTRAVENOUS
Status: ON HOLD | COMMUNITY

## 2022-01-09 RX ORDER — USTEKINUMAB 130 MG/26ML
AS DIRECTED SOLUTION INTRAVENOUS ONCE
Qty: 390 MILLIGRAMS | Refills: 0 | OUTPATIENT
Start: 2022-01-09 | End: 2022-01-10

## 2022-01-09 RX ORDER — LACTOBACILLUS RHAMNOSUS GG 10B CELL
AS DIRECTED CAPSULE ORAL
Status: ON HOLD | COMMUNITY

## 2022-01-09 RX ORDER — PREDNISOLONE 5 MG/1
1 TABLET IN THE MORNING WITH FOOD OR MILK TABLET ORAL ONCE A DAY
Status: ACTIVE | COMMUNITY

## 2022-01-09 RX ORDER — OLMESARTAN MEDOXOMIL 20 MG/1
1 TABLET TABLET ORAL ONCE A DAY
Status: ON HOLD | COMMUNITY

## 2022-01-09 RX ORDER — TENOFOVIR DISOPROXIL FUMARATE 300 MG/1
TAKE 1 TABLET BY MOUTH ONCE DAILY TABLET, FILM COATED ORAL
Qty: 30 TABLET | Refills: 1 | Status: ON HOLD | COMMUNITY

## 2022-01-13 ENCOUNTER — TELEPHONE ENCOUNTER (OUTPATIENT)
Dept: URBAN - METROPOLITAN AREA CLINIC 92 | Facility: CLINIC | Age: 57
End: 2022-01-13

## 2022-01-13 ENCOUNTER — OFFICE VISIT (OUTPATIENT)
Dept: URBAN - METROPOLITAN AREA CLINIC 91 | Facility: CLINIC | Age: 57
End: 2022-01-13
Payer: MEDICARE

## 2022-01-13 DIAGNOSIS — K76.0 HEPATIC STEATOSIS: ICD-10-CM

## 2022-01-13 DIAGNOSIS — K80.20 CHOLELITHIASIS: ICD-10-CM

## 2022-01-13 DIAGNOSIS — B18.1 CARRIER OF HEPATITIS B: ICD-10-CM

## 2022-01-13 PROCEDURE — 93975 VASCULAR STUDY: CPT

## 2022-01-13 PROCEDURE — 76705 ECHO EXAM OF ABDOMEN: CPT

## 2022-01-13 NOTE — HPI-TODAY'S VISIT:
Dear Jerzy Hardy, January 13 ultrasound shows pancreas not well seen due to gas and body habitus.  Limited exam was unremarkable. Liver was mildly echogenic and with no discrete liver lesion. Liver vessels were patent as expected. They do see some echogenic foci in the gallbladder lumen with possible posterior acoustic shadowing which would make them consider that you may have gallstones and/or some sludge balls there.  That appears to be new versus your prior exam of June of last year. Common bile duct was 2 mm and that was within normal limits.  Right kidney was 9.6 cm with no hydronephrosis. Spleen was normal at 11.3 cm. They suspect liver has some mild fatty infiltration and that you had these new gallstones versus sludge balls seen in the gallbladder lumen. In the prior study they mention a 1.2 cm echogenic lesion in the liver but they did not see this at this time. They recommended a 3 to 6-month follow-up right upper quadrant ultrasound and I would favor it being done at 3 months. I also noticed that you had a Dec 14 appt but no showed and you have not been rebooked to see us. Please call Sylvia at [phone] ext [phone] to be booked for an appt to see how you are doing and to review the plan and order this follow up. Thank you. Dr Harrington.

## 2022-01-26 ENCOUNTER — OFFICE VISIT (OUTPATIENT)
Dept: URBAN - METROPOLITAN AREA CLINIC 97 | Facility: CLINIC | Age: 57
End: 2022-01-26
Payer: MEDICARE

## 2022-01-26 ENCOUNTER — TELEPHONE ENCOUNTER (OUTPATIENT)
Dept: URBAN - METROPOLITAN AREA CLINIC 13 | Facility: CLINIC | Age: 57
End: 2022-01-26

## 2022-01-26 DIAGNOSIS — K51.80 CHRONIC PANCOLONIC ULCERATIVE COLITIS: ICD-10-CM

## 2022-01-26 PROCEDURE — 96413 CHEMO IV INFUSION 1 HR: CPT | Performed by: INTERNAL MEDICINE

## 2022-01-26 RX ORDER — OLMESARTAN MEDOXOMIL 20 MG/1
1 TABLET TABLET ORAL ONCE A DAY
Status: ON HOLD | COMMUNITY

## 2022-01-26 RX ORDER — ALPRAZOLAM 1 MG/1
1 TABLET TABLET ORAL TWICE A DAY
Status: ACTIVE | COMMUNITY

## 2022-01-26 RX ORDER — VEDOLIZUMAB 300 MG/5ML
AS DIRECTED INJECTION, POWDER, LYOPHILIZED, FOR SOLUTION INTRAVENOUS
Status: ON HOLD | COMMUNITY

## 2022-01-26 RX ORDER — COLCHICINE 0.6 MG/1
1 TABLET TABLET, FILM COATED ORAL QD PRN
Refills: 0 | Status: ON HOLD | COMMUNITY
Start: 1900-01-01

## 2022-01-26 RX ORDER — LACTOBACILLUS RHAMNOSUS GG 10B CELL
AS DIRECTED CAPSULE ORAL
Status: ON HOLD | COMMUNITY

## 2022-01-26 RX ORDER — FAMOTIDINE 20 MG/1
1 TABLET AT BEDTIME AS NEEDED TABLET, FILM COATED ORAL ONCE A DAY
Status: ON HOLD | COMMUNITY

## 2022-01-26 RX ORDER — TENOFOVIR DISOPROXIL FUMARATE 300 MG/1
TAKE 1 TABLET BY MOUTH ONCE DAILY TABLET, FILM COATED ORAL
Qty: 30 TABLET | Refills: 1 | Status: ON HOLD | COMMUNITY

## 2022-01-26 RX ORDER — PREDNISOLONE 5 MG/1
1 TABLET IN THE MORNING WITH FOOD OR MILK TABLET ORAL ONCE A DAY
Status: ACTIVE | COMMUNITY

## 2022-02-16 ENCOUNTER — TELEPHONE ENCOUNTER (OUTPATIENT)
Dept: URBAN - METROPOLITAN AREA CLINIC 92 | Facility: CLINIC | Age: 57
End: 2022-02-16

## 2022-02-16 RX ORDER — USTEKINUMAB 90 MG/ML
AS DIRECTED INJECTION, SOLUTION SUBCUTANEOUS
Qty: 1 SYRINGE | Refills: 4 | OUTPATIENT
Start: 2022-02-16 | End: 2022-11-23

## 2022-03-01 ENCOUNTER — TELEPHONE ENCOUNTER (OUTPATIENT)
Dept: URBAN - METROPOLITAN AREA CLINIC 92 | Facility: CLINIC | Age: 57
End: 2022-03-01

## 2022-03-02 ENCOUNTER — OFFICE VISIT (OUTPATIENT)
Dept: URBAN - METROPOLITAN AREA TELEHEALTH 2 | Facility: TELEHEALTH | Age: 57
End: 2022-03-02
Payer: MEDICARE

## 2022-03-02 DIAGNOSIS — K51.00 ULCERATIVE PANCOLITIS WITHOUT COMPLICATION: ICD-10-CM

## 2022-03-02 PROCEDURE — 99213 OFFICE O/P EST LOW 20 MIN: CPT | Performed by: INTERNAL MEDICINE

## 2022-03-02 RX ORDER — VEDOLIZUMAB 300 MG/5ML
AS DIRECTED INJECTION, POWDER, LYOPHILIZED, FOR SOLUTION INTRAVENOUS
Status: ON HOLD | COMMUNITY

## 2022-03-02 RX ORDER — OLMESARTAN MEDOXOMIL 20 MG/1
1 TABLET TABLET ORAL ONCE A DAY
Status: ON HOLD | COMMUNITY

## 2022-03-02 RX ORDER — COLCHICINE 0.6 MG/1
1 CAPSULE CAPSULE ORAL
Status: ACTIVE | COMMUNITY

## 2022-03-02 RX ORDER — USTEKINUMAB 90 MG/ML
AS DIRECTED INJECTION, SOLUTION SUBCUTANEOUS
Qty: 1 SYRINGE | Refills: 4 | Status: ACTIVE | COMMUNITY
Start: 2022-02-16 | End: 2022-11-23

## 2022-03-02 RX ORDER — LACTOBACILLUS RHAMNOSUS GG 10B CELL
AS DIRECTED CAPSULE ORAL
Status: ON HOLD | COMMUNITY

## 2022-03-02 RX ORDER — PREDNISOLONE 5 MG/1
1 TABLET IN THE MORNING WITH FOOD OR MILK TABLET ORAL ONCE A DAY
Status: ACTIVE | COMMUNITY

## 2022-03-02 RX ORDER — FAMOTIDINE 20 MG/1
1 TABLET AT BEDTIME AS NEEDED TABLET, FILM COATED ORAL ONCE A DAY
Status: ON HOLD | COMMUNITY

## 2022-03-02 RX ORDER — TENOFOVIR DISOPROXIL FUMARATE 300 MG/1
TAKE 1 TABLET BY MOUTH ONCE DAILY TABLET, FILM COATED ORAL
Qty: 30 TABLET | Refills: 1 | Status: ON HOLD | COMMUNITY

## 2022-03-02 RX ORDER — ALPRAZOLAM 1 MG/1
1 TABLET TABLET ORAL TWICE A DAY
Status: ACTIVE | COMMUNITY

## 2022-03-02 RX ORDER — COLCHICINE 0.6 MG/1
1 TABLET TABLET, FILM COATED ORAL QD PRN
Refills: 0 | Status: ON HOLD | COMMUNITY
Start: 1900-01-01

## 2022-03-02 NOTE — HPI-TODAY'S VISIT:
56yoM with hx of UC, left sided dx in 2011. Previously followed by Dr. Osorio. Had been on Remicade 5mg/kg q8 weeks since 2011 however flaring in the fall of 2020 and Prometheus panel showed undetectable remicade levels and presence of antibodies. He was transitioned to Humira in 11/2020 but despite this persistent sx and rrug levels 1.7 + detectable AB.   Transitioned to Entyvio in July 2021 with initialy improvement but then recurrent sx including diarrhea, hematochezia, urgency and weight loss. C diff neg. Despite po steroids persistent sx and admitted to [facility] 1/5-1/9 Underwent colonoscopy 1/6 which showed continuous circumferential inflammation from rectum to transverse colon, whelan grade 2-3, path with moderate colitis rectum, L colon and transverse, neg for CMV. he was started on solumedrol 20 IV x3 days which improved his GI symptoms and ankle pain/gout.  Labs H/H (L) 12.7/38.2 iRON  (L) 34   TIBC 275   Ferritin 11 C diff neg  Discharged on PO prednisone taper (40mg/d x14 days and taper by 5mg/week). Currently on 20mg and having BMs once/day, formed and non-bloody. Received stelara infusion 1/26 and pens being shipped to pt now for end of month injection. Denies abd pain, N/V, f/c. Tolerating po and regained weight he had lost  Quant indeterminate. CXR no signs of TB. Follows with Dr. Harrington/Liver clinic for Hep B core ab + on viread.  Colonoscopy in 2019 with 1cm transverse colon adenoma. No bx obtained around polyp base. Repeat colonoscopy 8/2020 with morderate inflammation upto 15cm from anal verge. Bx + chronic active proctitis

## 2022-04-06 ENCOUNTER — OFFICE VISIT (OUTPATIENT)
Dept: URBAN - METROPOLITAN AREA CLINIC 92 | Facility: CLINIC | Age: 57
End: 2022-04-06

## 2022-05-05 ENCOUNTER — WEB ENCOUNTER (OUTPATIENT)
Dept: URBAN - METROPOLITAN AREA CLINIC 92 | Facility: CLINIC | Age: 57
End: 2022-05-05

## 2022-05-05 ENCOUNTER — OFFICE VISIT (OUTPATIENT)
Dept: URBAN - METROPOLITAN AREA CLINIC 92 | Facility: CLINIC | Age: 57
End: 2022-05-05
Payer: MEDICARE

## 2022-05-05 ENCOUNTER — LAB OUTSIDE AN ENCOUNTER (OUTPATIENT)
Dept: URBAN - METROPOLITAN AREA CLINIC 92 | Facility: CLINIC | Age: 57
End: 2022-05-05

## 2022-05-05 VITALS
WEIGHT: 201 LBS | DIASTOLIC BLOOD PRESSURE: 86 MMHG | TEMPERATURE: 97 F | SYSTOLIC BLOOD PRESSURE: 116 MMHG | HEART RATE: 88 BPM | HEIGHT: 70 IN | BODY MASS INDEX: 28.77 KG/M2

## 2022-05-05 DIAGNOSIS — K51.00 ULCERATIVE PANCOLITIS WITHOUT COMPLICATION: ICD-10-CM

## 2022-05-05 PROCEDURE — 99214 OFFICE O/P EST MOD 30 MIN: CPT | Performed by: PHYSICIAN ASSISTANT

## 2022-05-05 RX ORDER — USTEKINUMAB 90 MG/ML
AS DIRECTED INJECTION, SOLUTION SUBCUTANEOUS
Qty: 1 SYRINGE | Refills: 4 | Status: ACTIVE | COMMUNITY
Start: 2022-02-16 | End: 2022-11-23

## 2022-05-05 RX ORDER — COLCHICINE 0.6 MG/1
1 CAPSULE CAPSULE ORAL
Status: ACTIVE | COMMUNITY

## 2022-05-05 NOTE — HPI-TODAY'S VISIT:
56yoM with hx of UC, left sided dx in 2011. Previously followed by Dr. Osorio. Had been on Remicade 5mg/kg   q8 weeks since 2011 however flaring in the fall of 2020 and Prometheus panel showed undetectable remicade  levels and presence of antibodies. He was transitioned to Humira in 11/2020 but despite this persistent sx and rrug levels 1.7 + detectable AB.   Transitioned to Entyvio in July 2021 with initial improvement but then recurrent sx including diarrhea, hematochezia, urgency and weight loss. C diff neg. Despite po steroids persistent sx and admitted to [facility] 1/5-1/9 Underwent colonoscopy 1/6 which showed continuous circumferential inflammation from rectum to transverse colon, whelan grade 2-3, path with moderate colitis rectum, L colon and transverse, neg for CMV. he was started on solumedrol 20 IV x3 days which improved his GI symptoms and ankle pain/gout.  Labs H/H (L) 12.7/38.2 iRON  (L) 34   TIBC 275   Ferritin 11 C diff neg  Discharged on PO prednisone taper, now off steroids for 6+ weeks and s/p Stelara infusion 1/26 and 1 maintenance dose. He is in clinical remission: having BMs formed 2-3/day without urgency, pain and bleeding. Weight back to baseline.   Follows with Dr. Harrington/Liver clinic for Hep B core ab + on viread.  Colonoscopy in 2019 with 1cm transverse colon adenoma. No bx obtained around polyp base. Repeat colonoscopy 8/2020 with morderate inflammation upto 15cm from anal verge. Bx + chronic active proctitis  Join pains sign improved. No rashes

## 2022-05-06 LAB — C-REACTIVE PROTEIN, QUANT: 16

## 2022-05-12 ENCOUNTER — TELEPHONE ENCOUNTER (OUTPATIENT)
Dept: URBAN - METROPOLITAN AREA CLINIC 92 | Facility: CLINIC | Age: 57
End: 2022-05-12

## 2022-05-12 LAB — CALPROTECTIN, FECAL: 833

## 2022-08-22 PROBLEM — 444548001: Status: ACTIVE | Noted: 2022-01-09

## 2022-08-23 ENCOUNTER — TELEPHONE ENCOUNTER (OUTPATIENT)
Dept: URBAN - METROPOLITAN AREA CLINIC 92 | Facility: CLINIC | Age: 57
End: 2022-08-23

## 2022-08-26 ENCOUNTER — LAB OUTSIDE AN ENCOUNTER (OUTPATIENT)
Dept: URBAN - METROPOLITAN AREA CLINIC 92 | Facility: CLINIC | Age: 57
End: 2022-08-26

## 2022-08-29 ENCOUNTER — LAB OUTSIDE AN ENCOUNTER (OUTPATIENT)
Dept: URBAN - METROPOLITAN AREA CLINIC 92 | Facility: CLINIC | Age: 57
End: 2022-08-29

## 2022-08-29 LAB — C-REACTIVE PROTEIN, QUANT: 9.6

## 2022-09-02 LAB — CALPROTECTIN, FECAL: 4390

## 2022-09-08 ENCOUNTER — LAB OUTSIDE AN ENCOUNTER (OUTPATIENT)
Dept: URBAN - METROPOLITAN AREA CLINIC 92 | Facility: CLINIC | Age: 57
End: 2022-09-08

## 2022-09-08 ENCOUNTER — OFFICE VISIT (OUTPATIENT)
Dept: URBAN - METROPOLITAN AREA CLINIC 92 | Facility: CLINIC | Age: 57
End: 2022-09-08
Payer: MEDICARE

## 2022-09-08 ENCOUNTER — WEB ENCOUNTER (OUTPATIENT)
Dept: URBAN - METROPOLITAN AREA CLINIC 92 | Facility: CLINIC | Age: 57
End: 2022-09-08

## 2022-09-08 VITALS
HEIGHT: 70 IN | SYSTOLIC BLOOD PRESSURE: 114 MMHG | TEMPERATURE: 97 F | WEIGHT: 218 LBS | HEART RATE: 87 BPM | BODY MASS INDEX: 31.21 KG/M2 | DIASTOLIC BLOOD PRESSURE: 83 MMHG

## 2022-09-08 DIAGNOSIS — K51.00 ULCERATIVE PANCOLITIS WITHOUT COMPLICATION: ICD-10-CM

## 2022-09-08 PROCEDURE — 99214 OFFICE O/P EST MOD 30 MIN: CPT | Performed by: PHYSICIAN ASSISTANT

## 2022-09-08 RX ORDER — UPADACITINIB 45 MG/1
AS DIRECTED TABLET, EXTENDED RELEASE ORAL ONCE A DAY
Qty: 56 | Refills: 0 | OUTPATIENT
Start: 2022-09-08 | End: 2022-11-03

## 2022-09-08 RX ORDER — USTEKINUMAB 90 MG/ML
AS DIRECTED INJECTION, SOLUTION SUBCUTANEOUS
Qty: 1 SYRINGE | Refills: 4 | Status: ACTIVE | COMMUNITY
Start: 2022-02-16 | End: 2022-11-23

## 2022-09-08 RX ORDER — OLMESARTAN MEDOXOMIL AND HYDROCHLOROTHIAZIDE 20/12.5 20; 12.5 MG/1; MG/1
1 TABLET TABLET ORAL ONCE A DAY
Status: ACTIVE | COMMUNITY

## 2022-09-08 RX ORDER — COLCHICINE 0.6 MG/1
1 CAPSULE CAPSULE ORAL
Status: ACTIVE | COMMUNITY

## 2022-09-08 RX ORDER — DULOXETINE HYDROCHLORIDE 60 MG/1
1 CAPSULE CAPSULE, DELAYED RELEASE ORAL ONCE A DAY
Status: ACTIVE | COMMUNITY

## 2022-09-08 NOTE — HPI-TODAY'S VISIT:
56yoM with hx of UC, left sided dx in 2011. Previously followed by Dr. Osorio. Had been on Remicade 5mg/kg  q8 weeks since 2011 however flaring in the fall of 2020 and Prometheus panel showed undetectable remicade  levels and presence of antibodies. He was transitioned to Humira in 11/2020 but despite this persistent sx and drug levels 1.7 + detectable AB.   Transitioned to Entyvio in July 2021 with initial improvement but then recurrent sx including diarrhea, hematochezia, urgency and weight loss. C diff neg. Despite po steroids persistent sx and admitted to [facility] 1/5-1/9 Underwent colonoscopy 1/6 which showed continuous circumferential inflammation from rectum to transverse colon, whelan grade 2-3, path with moderate colitis rectum, L colon and transverse, neg for CMV. he was started on solumedrol 20 IV x3 days which improved his GI symptoms and ankle pain/gout.  Labs H/H (L) 12.7/38.2 iRON  (L) 34   TIBC 275   Ferritin 11 C diff neg CRP 32.9  Discharged on PO prednisone taper and initiated on Stelara infusion 1/26 and Q8W maintenance doses. He was in clinical remission at his last OV in May (6 weeks off steroids) and FC improved but now increased as below with FC elevation and he reports he has been self tx with steorids from June -Aug, rx'd by PCP 10mg/day due to persistent diarrhea and intermittent hematochezia despite stelara. No joint pains or EIMs  Follows with Dr. Harrington/Liver clinic for Hep B core ab + on viread. Colonoscopy 8/2020 with moderate inflammation upto 15cm from anal verge. Bx + chronic active proctitis  Got shingles vaccines at 7

## 2022-09-13 LAB
A/G RATIO: 1.4
ABSOLUTE BASOPHILS: 21
ABSOLUTE EOSINOPHILS: 91
ABSOLUTE LYMPHOCYTES: 2597
ABSOLUTE MONOCYTES: 413
ABSOLUTE NEUTROPHILS: 3878
ALBUMIN: 4.2
ALKALINE PHOSPHATASE: 59
ALT (SGPT): 10
AST (SGOT): 12
BASOPHILS: 0.3
BILIRUBIN, TOTAL: 0.7
BUN/CREATININE RATIO: 10
BUN: 14
C-REACTIVE PROTEIN, QUANT: 1.9
CALCIUM: 9.3
CARBON DIOXIDE, TOTAL: 28
CHLORIDE: 101
CHOL/HDLC RATIO: 3
CHOLESTEROL, TOTAL: 161
CREATININE: 1.44
EGFR: 57
EOSINOPHILS: 1.3
GLOBULIN, TOTAL: 3.1
GLUCOSE: 105
HDL CHOLESTEROL: 53
HEMATOCRIT: 44.6
HEMOGLOBIN: 14.7
LDL CHOLESTEROL CALC: 89
LYMPHOCYTES: 37.1
MCH: 28.4
MCHC: 33
MCV: 86.3
MITOGEN-NIL: 7.59
MONOCYTES: 5.9
MPV: 9.1
NEUTROPHILS: 55.4
NIL: 0.06
NON HDL CHOLESTEROL: 108
PLATELET COUNT: 295
POTASSIUM: 4.2
PROTEIN, TOTAL: 7.3
QUANTIFERON(R)-TB GOLD: NEGATIVE
RDW: 14.2
RED BLOOD CELL COUNT: 5.17
SED RATE BY MODIFIED: 2
SODIUM: 138
TB1-NIL: <0
TB2-NIL: <0
TRIGLYCERIDES: 99
WHITE BLOOD CELL COUNT: 7

## 2022-09-19 ENCOUNTER — TELEPHONE ENCOUNTER (OUTPATIENT)
Dept: URBAN - METROPOLITAN AREA CLINIC 92 | Facility: CLINIC | Age: 57
End: 2022-09-19

## 2022-09-19 RX ORDER — UPADACITINIB 45 MG/1
AS DIRECTED TABLET, EXTENDED RELEASE ORAL ONCE A DAY
Qty: 56 | Refills: 0 | OUTPATIENT
Start: 2022-09-08 | End: 2022-11-13

## 2022-10-18 ENCOUNTER — TELEPHONE ENCOUNTER (OUTPATIENT)
Dept: URBAN - METROPOLITAN AREA CLINIC 92 | Facility: CLINIC | Age: 57
End: 2022-10-18

## 2022-10-19 ENCOUNTER — ERX REFILL RESPONSE (OUTPATIENT)
Dept: URBAN - METROPOLITAN AREA CLINIC 92 | Facility: CLINIC | Age: 57
End: 2022-10-19

## 2022-10-19 RX ORDER — UPADACITINIB 45 MG/1
AS DIRECTED TABLET, EXTENDED RELEASE ORAL ONCE A DAY
Qty: 56 | Refills: 0 | OUTPATIENT

## 2022-10-19 RX ORDER — UPADACITINIB 45 MG/1
TAKE 1 TABLET BY MOUTH 1 TIME A DAY TABLET, EXTENDED RELEASE ORAL
Qty: 28 | Refills: 0 | OUTPATIENT

## 2022-10-23 LAB — CALPROTECTIN, FECAL: 36

## 2022-11-04 ENCOUNTER — OFFICE VISIT (OUTPATIENT)
Dept: URBAN - METROPOLITAN AREA SURGERY CENTER 16 | Facility: SURGERY CENTER | Age: 57
End: 2022-11-04

## 2022-11-11 ENCOUNTER — OFFICE VISIT (OUTPATIENT)
Dept: URBAN - METROPOLITAN AREA SURGERY CENTER 16 | Facility: SURGERY CENTER | Age: 57
End: 2022-11-11

## 2022-11-11 ENCOUNTER — CLAIMS CREATED FROM THE CLAIM WINDOW (OUTPATIENT)
Dept: URBAN - METROPOLITAN AREA SURGERY CENTER 16 | Facility: SURGERY CENTER | Age: 57
End: 2022-11-11
Payer: MEDICARE

## 2022-11-11 DIAGNOSIS — K51.00 ACUTE ULCERATIVE PANCOLITIS: ICD-10-CM

## 2022-11-11 PROCEDURE — 45380 COLONOSCOPY AND BIOPSY: CPT | Performed by: INTERNAL MEDICINE

## 2022-11-11 PROCEDURE — G8907 PT DOC NO EVENTS ON DISCHARG: HCPCS | Performed by: INTERNAL MEDICINE

## 2022-11-17 ENCOUNTER — OFFICE VISIT (OUTPATIENT)
Dept: URBAN - METROPOLITAN AREA CLINIC 92 | Facility: CLINIC | Age: 57
End: 2022-11-17
Payer: MEDICARE

## 2022-11-17 VITALS
HEART RATE: 109 BPM | TEMPERATURE: 97 F | WEIGHT: 225 LBS | SYSTOLIC BLOOD PRESSURE: 121 MMHG | BODY MASS INDEX: 32.21 KG/M2 | HEIGHT: 70 IN | DIASTOLIC BLOOD PRESSURE: 80 MMHG

## 2022-11-17 DIAGNOSIS — K51.00 ULCERATIVE PANCOLITIS WITHOUT COMPLICATION: ICD-10-CM

## 2022-11-17 PROBLEM — 444548001: Status: ACTIVE | Noted: 2021-06-07

## 2022-11-17 PROCEDURE — 99214 OFFICE O/P EST MOD 30 MIN: CPT | Performed by: PHYSICIAN ASSISTANT

## 2022-11-17 RX ORDER — COLCHICINE 0.6 MG/1
1 CAPSULE CAPSULE ORAL
Status: ACTIVE | COMMUNITY

## 2022-11-17 RX ORDER — UPADACITINIB 45 MG/1
TAKE 1 TABLET BY MOUTH 1 TIME A DAY TABLET, EXTENDED RELEASE ORAL
Qty: 28 | Refills: 0 | Status: ACTIVE | COMMUNITY

## 2022-11-17 RX ORDER — OLMESARTAN MEDOXOMIL AND HYDROCHLOROTHIAZIDE 20/12.5 20; 12.5 MG/1; MG/1
1 TABLET TABLET ORAL ONCE A DAY
Status: ACTIVE | COMMUNITY

## 2022-11-17 RX ORDER — UPADACITINIB 30 MG/1
1 TABLET TABLET, EXTENDED RELEASE ORAL ONCE A DAY
Qty: 30 | Refills: 6 | OUTPATIENT
Start: 2022-11-17 | End: 2023-06-15

## 2022-11-17 RX ORDER — DULOXETINE HYDROCHLORIDE 60 MG/1
1 CAPSULE CAPSULE, DELAYED RELEASE ORAL ONCE A DAY
Status: ACTIVE | COMMUNITY

## 2022-11-17 RX ORDER — USTEKINUMAB 90 MG/ML
AS DIRECTED INJECTION, SOLUTION SUBCUTANEOUS
Qty: 1 SYRINGE | Refills: 4 | Status: ACTIVE | COMMUNITY
Start: 2022-02-16 | End: 2022-11-23

## 2022-11-17 NOTE — HPI-TODAY'S VISIT:
57yoM with hx of UC, left sided dx in 2011. Previously followed by Dr. Osorio. Had been on Remicade 5mg/kg  q8 weeks since 2011 however flaring in the fall of 2020 and Prometheus panel showed undetectable remicade  levels and presence of antibodies. He was transitioned to Humira in 11/2020 but despite this persistent sx and drug levels 1.7 + detectable AB.   Transitioned to Entyvio in July 2021 with initial improvement but then recurrent sx including diarrhea, hematochezia, urgency and weight loss. C diff neg. Despite po steroids persistent sx and admitted to [facility] 1/5-1/9 Underwent colonoscopy 1/6 which showed continuous circumferential inflammation from rectum to transverse colon, whelan grade 2-3, path with moderate colitis rectum, L colon and transverse, neg for CMV. he was started on solumedrol 20 IV x3 days which improved his GI symptoms and ankle pain/gout.  Labs H/H (L) 12.7/38.2 iRON  (L) 34   TIBC 275   Ferritin 11 C diff neg CRP 32.9  Discharged on PO prednisone taper and initiated on Stelara infusion 1/26 and Q8W maintenance doses. He was in clinical remission in May (6 weeks off steroids) and FC improved but then had FC elevation and increase in diarrhea and intermittent hematochezia despite stelara. Self rx'd steroids and in Sept was given Rinvoq 45/day, now completed 8 weeks and in clinical remission with 1 formed BM/day. No blood, pain, urgency. Gained 7#. FC repeated and in the 30s. Arthritis resolved with rinvoq  Colonoscopy 8/2020 with moderate inflammation upto 15cm from anal verge. Bx + chronic active proctitis  Repeat Colonoscopy 11/11/2022 showed patchy mild erythema in desc, transv, ascending, bx pending.   Follows with Dr. Harrington/Liver clinic for Hep B core ab + on viread. Got shingles vaccines

## 2022-11-18 ENCOUNTER — TELEPHONE ENCOUNTER (OUTPATIENT)
Dept: URBAN - METROPOLITAN AREA CLINIC 5 | Facility: CLINIC | Age: 57
End: 2022-11-18

## 2022-11-18 PROBLEM — 161646004 H/O: HIGH RISK MEDICATION: Status: ACTIVE | Noted: 2021-06-06

## 2022-11-18 PROBLEM — 64766004 ULCERATIVE COLITIS: Status: ACTIVE | Noted: 2021-06-02

## 2022-11-18 LAB
A/G RATIO: 1.5
ABSOLUTE BASOPHILS: 22
ABSOLUTE EOSINOPHILS: 7
ABSOLUTE LYMPHOCYTES: 1879
ABSOLUTE MONOCYTES: 598
ABSOLUTE NEUTROPHILS: 4694
ALBUMIN: 4.2
ALKALINE PHOSPHATASE: 58
ALT (SGPT): 22
AST (SGOT): 23
BASOPHILS: 0.3
BILIRUBIN, TOTAL: 0.6
BUN/CREATININE RATIO: (no result)
BUN: 16
C-REACTIVE PROTEIN, QUANT: 3.8
CALCIUM: 9.4
CARBON DIOXIDE, TOTAL: 27
CHLORIDE: 103
CHOL/HDLC RATIO: 4.7
CHOLESTEROL, TOTAL: 177
CREATININE: 1.23
EGFR: 69
EOSINOPHILS: 0.1
GLOBULIN, TOTAL: 2.8
GLUCOSE: 86
HDL CHOLESTEROL: 38
HEMATOCRIT: 40.2
HEMOGLOBIN: 13.9
LDL CHOLESTEROL CALC: 112
LYMPHOCYTES: 26.1
MCH: 30.8
MCHC: 34.6
MCV: 89.1
MONOCYTES: 8.3
MPV: 9.3
NEUTROPHILS: 65.2
NON HDL CHOLESTEROL: 139
PLATELET COUNT: 382
POTASSIUM: 4.5
PROTEIN, TOTAL: 7
RDW: 16.6
RED BLOOD CELL COUNT: 4.51
SODIUM: 138
TRIGLYCERIDES: 159
WHITE BLOOD CELL COUNT: 7.2

## 2023-05-18 ENCOUNTER — OFFICE VISIT (OUTPATIENT)
Dept: URBAN - METROPOLITAN AREA CLINIC 92 | Facility: CLINIC | Age: 58
End: 2023-05-18
Payer: MEDICARE

## 2023-05-18 VITALS
TEMPERATURE: 96.4 F | WEIGHT: 228 LBS | SYSTOLIC BLOOD PRESSURE: 134 MMHG | HEIGHT: 70 IN | HEART RATE: 108 BPM | DIASTOLIC BLOOD PRESSURE: 76 MMHG | BODY MASS INDEX: 32.64 KG/M2

## 2023-05-18 DIAGNOSIS — K51.911 ULCERATIVE COLITIS, UNSPECIFIED WITH RECTAL BLEEDING: ICD-10-CM

## 2023-05-18 DIAGNOSIS — Z79.899 HIGH RISK MEDICATION USE: ICD-10-CM

## 2023-05-18 PROCEDURE — 99214 OFFICE O/P EST MOD 30 MIN: CPT | Performed by: INTERNAL MEDICINE

## 2023-05-18 RX ORDER — UPADACITINIB 30 MG/1
1 TABLET TABLET, EXTENDED RELEASE ORAL ONCE A DAY
Qty: 30 | Refills: 6 | Status: ACTIVE | COMMUNITY
Start: 2022-11-17 | End: 2023-06-15

## 2023-05-18 RX ORDER — COLCHICINE 0.6 MG/1
1 CAPSULE CAPSULE ORAL
Status: ACTIVE | COMMUNITY

## 2023-05-18 RX ORDER — OLMESARTAN MEDOXOMIL AND HYDROCHLOROTHIAZIDE 20/12.5 20; 12.5 MG/1; MG/1
1 TABLET TABLET ORAL ONCE A DAY
Status: ACTIVE | COMMUNITY

## 2023-05-18 RX ORDER — DULOXETINE HYDROCHLORIDE 60 MG/1
1 CAPSULE CAPSULE, DELAYED RELEASE ORAL ONCE A DAY
Status: ACTIVE | COMMUNITY

## 2023-05-18 NOTE — HPI-TODAY'S VISIT:
58yoM with hx of UC, left sided dx in 2011. Previously followed by Dr. Osorio. Had been on Remicade 5mg/kg  q8 weeks since 2011 however flaring in the fall of 2020 and Prometheus panel showed undetectable remicade  levels and presence of antibodies. He was transitioned to Humira in 11/2020 but despite this persistent sx and drug levels 1.7 + detectable AB.   Transitioned to Entyvio in July 2021 with initial improvement but then recurrent sx. Despite po steroids persistent sx and admitted to [facility] 1/5-1/9 Underwent colonoscopy 1/6 which showed continuous circumferential inflammation from rectum to transverse colon, whelan grade 2-3, path with moderate colitis rectum, L colon and transverse, neg for CMV. he was started on solumedrol 20 IV x3 days which improved his GI symptoms and ankle pain/gout.  Discharged on PO prednisone taper and initiated on Stelara infusion 1/26 and Q8W maintenance doses. He was in clinical remission in May initially but then FC elevation and increase in sx. Self rx'd steroids and in Sept 2022 was given Rinvoq with immediate clinical remission and resolution of arthritis (on 30mg dose). BMs once/day, no blood, no urgency. No abd pain or joint pain.   Colonoscopy 8/2020 with moderate inflammation upto 15cm from anal verge. Bx + chronic active proctitis  Repeat Colonoscopy 11/11/2022 showed patchy mild erythema in desc, transv, ascending, bx X6 without active UC.   Follows with Dr. Harrington/Liver clinic for Hep B core ab, was on viread but stopped this about 1 year ago Got shingles vaccin

## 2023-05-22 ENCOUNTER — ERX REFILL RESPONSE (OUTPATIENT)
Dept: URBAN - METROPOLITAN AREA CLINIC 92 | Facility: CLINIC | Age: 58
End: 2023-05-22

## 2023-05-22 RX ORDER — UPADACITINIB 30 MG/1
1 TABLET TABLET, EXTENDED RELEASE ORAL ONCE A DAY
Qty: 30 | Refills: 6 | OUTPATIENT

## 2023-05-22 RX ORDER — UPADACITINIB 30 MG/1
TAKE 1 TABLET BY MOUTH 1 TIME A DAY TABLET, EXTENDED RELEASE ORAL
Qty: 30 | Refills: 6 | OUTPATIENT

## 2023-05-24 LAB — CALPROTECTIN, FECAL: 90

## 2023-05-25 LAB
A/G RATIO: 1.3
ABSOLUTE BASOPHILS: 10
ABSOLUTE EOSINOPHILS: 10
ABSOLUTE LYMPHOCYTES: 1443
ABSOLUTE MONOCYTES: 495
ABSOLUTE NEUTROPHILS: 3142
ALBUMIN: 4.5
ALKALINE PHOSPHATASE: 45
ALT (SGPT): 41
AST (SGOT): 42
BASOPHILS: 0.2
BILIRUBIN, TOTAL: 0.7
BUN/CREATININE RATIO: 15
BUN: 24
CALCIUM: 9.6
CARBON DIOXIDE, TOTAL: 28
CHLORIDE: 101
CHOL/HDLC RATIO: 5.4
CHOLESTEROL, TOTAL: 249
CREATININE: 1.62
EGFR: 50
EOSINOPHILS: 0.2
GLOBULIN, TOTAL: 3.6
GLUCOSE: 108
HDL CHOLESTEROL: 46
HEMATOCRIT: 43.3
HEMOGLOBIN: 15.3
HEPATITIS B VIRUS DNA: (no result)
HEPATITIS B VIRUS DNA: (no result)
LDL CHOLESTEROL CALC: 172
LYMPHOCYTES: 28.3
MCH: 34.4
MCHC: 35.3
MCV: 97.3
MITOGEN-NIL: 3.04
MONOCYTES: 9.7
MPV: 9.7
NEUTROPHILS: 61.6
NON HDL CHOLESTEROL: 203
PLATELET COUNT: 299
POTASSIUM: 4.6
PROTEIN, TOTAL: 8.1
QUANTIFERON NIL VALUE: 0.02
QUANTIFERON TB1 AG VALUE: 0
QUANTIFERON TB2 AG VALUE: 0
QUANTIFERON-TB GOLD PLUS: NEGATIVE
RDW: 14.2
RED BLOOD CELL COUNT: 4.45
SODIUM: 137
TRIGLYCERIDES: 161
WHITE BLOOD CELL COUNT: 5.1

## 2023-05-26 ENCOUNTER — TELEPHONE ENCOUNTER (OUTPATIENT)
Dept: URBAN - METROPOLITAN AREA CLINIC 92 | Facility: CLINIC | Age: 58
End: 2023-05-26

## 2023-06-05 ENCOUNTER — OFFICE VISIT (OUTPATIENT)
Dept: URBAN - METROPOLITAN AREA CLINIC 86 | Facility: CLINIC | Age: 58
End: 2023-06-05

## 2023-06-05 RX ORDER — DULOXETINE HYDROCHLORIDE 60 MG/1
1 CAPSULE CAPSULE, DELAYED RELEASE ORAL ONCE A DAY
Status: ACTIVE | COMMUNITY

## 2023-06-05 RX ORDER — COLCHICINE 0.6 MG/1
1 CAPSULE CAPSULE ORAL
Status: ACTIVE | COMMUNITY

## 2023-06-05 RX ORDER — UPADACITINIB 30 MG/1
TAKE 1 TABLET BY MOUTH 1 TIME A DAY TABLET, EXTENDED RELEASE ORAL
Qty: 30 | Refills: 6 | Status: ACTIVE | COMMUNITY

## 2023-06-05 RX ORDER — TENOFOVIR DISPROXIL FUMARATE 300 MG/1
1 TABLET TABLET ORAL ONCE A DAY
Qty: 30 | Refills: 2 | OUTPATIENT

## 2023-06-05 RX ORDER — OLMESARTAN MEDOXOMIL AND HYDROCHLOROTHIAZIDE 20/12.5 20; 12.5 MG/1; MG/1
1 TABLET TABLET ORAL ONCE A DAY
Status: ACTIVE | COMMUNITY

## 2023-06-05 NOTE — HPI-TODAY'S VISIT:
Patient is a 56-year-old male referred by Dr. Yonatan Silva Aug  2021 for evaluation of liver issue if the hep b core positive and on tx now for HBV prophylaxis for same due to isp.  A copy of the note will be sent to Dr. Yonatan Silva.  6/5/23 January 13 ultrasound shows pancreas not well seen due to gas and body habitus.  Limited exam was unremarkable. Liver was mildly echogenic and with no discrete liver lesion. Liver vessels were patent as expected. They do see some echogenic foci in the gallbladder lumen with possible posterior acoustic shadowing which would make them consider that you may have gallstones and/or some sludge balls there.  That appears to be new versus your prior exam of June of last year. Common bile duct was 2 mm and that was within normal limits.  Right kidney was 9.6 cm with no hydronephrosis. Spleen was normal at 11.3 cm. They suspect liver has some mild fatty infiltration and that you had these new gallstones versus sludge balls seen in the gallbladder lumen. In the prior study they mention a 1.2 cm echogenic lesion in the liver but they did not see this at this time. They recommended a 3 to 6-month follow-up right upper quadrant ultrasound and I would favor it being done at 3 months. I also noticed that you had a Dec 14 appt but no showed and you have not been rebooked to see us. Please call Sylvia at [phone] ext [phone] to be booked for an appt to see how you are doing and to review the plan and order this follow up. Thank you. Dr Harrington.  Last week did labs and not back and sylvia will check on these.  Dear Jerzy Hardy, Nov 2021 labs: Hepatitis B surface antibody remains immune but it is slightly lower on the titer/level at 35 rather than 51.1 prior.  If drops to not detected, could give a booster dose of the hepatitis b vaccine for an amnestic response. Hep B quantitative DNA remains not detected. Glucose 99 BUN of 10 creatinine 1.22 and previously was 1.18 and prior to that 1.61.  It is currently in normal range at 1.22. Sodium 136 potassium 4.4 albumin 4.4 and calcium 9.9 total bilirubin 0.7 alkaline phosphatase 80 AST 17 and ALT 10 with ideal ALT less than 35 so doing well there. White blood cell count 6.8 hemoglobin 14.7 which is up from 13.1.  Platelet count normal at 236.  MCV normal at 84.  Neutrophils 3.7 and lymphocytes normal at 2.4. Dr. Harrington Dear Jerzy Hardy, August 5 labs show white blood cell count 8.8 RBC count slightly low at 4.09 with normal from 4.14 up to 5.8. Previously you were normal at 4.74 and 5.5 but clearly lower now. Please share with primary provider. Hemoglobin lower also at 13.1 from previous 14.5 and prior 16.8. Hemoglobin currently at the lower side of normal. Platelet count 318. Neutrophils normal at 4.4 with lymphocytes up a little bit at 3.5. Were you ill recently? Glucose 72 BUN down to 15 from 28. Creatinine down to 1.18 from 1.61. Sodium 140 potassium 4.8 calcium 9.8 albumin 3.9 bilirubin 0.4 alkaline phosphatase 85 AST 13 ALT 13. Previously AST 13 and ALT of 8. Hep B DNA not detected. So labs for liver better and creatinine and suspect prior diarrhea and colitis issues had affected him and dropped the hg. He says not bleeding now and bowels better. Pt says also had a cold and so maybe explains the lymphocytes being little up. Spoke with pt. Dr Harrington   June 23 2021 labs show hepatitis B immunity so you are protected. Hep B DNA not detected. Glucose 92 BUN of 28 creatinine is elevated at 1.61 and has varied from 1.23 up to 1.53 before. We need to look at your hydration status with these changes. The summer heat has been kicking up lately. Sodium 137 potassium 4.9 calcium 9.9 albumin 4.3 bilirubin 0.5 alkaline phosphatase 95 AST down to 13 ALT of 8 previously AST 29 ALT 17 so liver enzymes definitely lower. White blood cell count 7 hemoglobin 14.5 platelet count 315 MCV 94 neutrophils 3.4. Per notes on tenofovir df po qd and so still above 50. Egfr 55. Having diarrhea at the time and he says that it is better.  He did the 2nd entyvio now about 2 weeks ago and says starting to do better now.  He says the diarrhea not there now and some hemorrhodis.  Dr Silva saw recently with Namrata and they see him again sept. Did entyvio aug 23.  Aug 5 wbc 8.8 and hg 13.1 plat 318 and mcv 95 and neutrophils 4.4 and lymph 3.5 little up and glu 72 and cr 1.18 and na 140 and k 4.8 and cl 103 and c02 26 and alb 3.9 and tv 0.4 and alk 85 and ast and alt 13. HBV pending.  Recap: Patient was last seen back on [facility] 3 by Namrata Pillai PA-C, for his noted hx of history of ulcerative colitis left-sided first noted in 2011. Previously followed by Dr. Rasmussen and few others . Also had seen Dr En Mattson as well prior.  Had been on Remicade 5 mg/kg every 8 weeks since 2011 and did not respond to oral treatment prior. No known complications or surgeries noted.  Patient on the Remicade had been much better but never was in clinical remission and still with persistent joint pains.  Patient in the fall 2020 was noted flaring and a Prometheus panel showed undetected Remicade levels and presence of antibodies.  He was transitioned to Humira in November 2020 on a every 2-week basis and is being followed for this. He says when getting higher dose doing beter and then more issues when lower.  During induction he felt well but with maintenance started had some increase in symptoms. 1 pen misfired in January. Noted to have some hematochezia in January and then more increased bright red blood per rectum with bowel movements. 10 to 15 bms per day. Patient lost 7 pounds.  Patient started on prednisone in June at 10 mg a day with some mild improvement during the day but still with nocturnal stools.  He was changed to budesonide 9mg a day and on that until 3rd dose. Also to do a suppository and says had issues when had diarrhea and doing better now also.  BM post meals 4 a day and most solid now. occ runny. Prior more watery.  Colonoscopy 2019 with 1 cm transverse colon adenoma.  August 2020 colonoscopy mild inflammation up to 15 cm from anal verge biopsies showed chronic active proctitis. Remaining colon normal. He stopped herbals. Only onprobiotic and only on culturelle a day.  Prior pt listed as taking some herbals specifically a papaya enzyme extract, some glucosamine sulfate, aloe vera, omega-3 as well as a probiotic. Also noted to be on allopurinol and colchicine for gout.  Weight noted to be 28.55 BMI.  Dec 2020 Liver enzymes show bilirubin 0.8 alkaline phosphatase 65 AST 29 ALT 17. White cell count 5.7 hemoglobin 60.8 plate count 224. Glucose 92 BUN of 17 creatinine 1.23 sodium 140 potassium 4.3. Patient had hepatitis as well as quantiferon on Radha 3 and was indeterminant and to get cxr for tb. No symptoms from this. October 2020 labs showed QuantiFERON gold plus was negative. White cell count 5.7 hemoglobin 16.8 platelet count 224 back in December 2020 AST then 29 ALT 17 alk phos 65 total bilirubin 0.8 sodium 140 potassium 4.3 glucose 92 BUN of 17 creatinine 1.23. Vitamin D level 86.6. June 2021 labs show hep B surface antigen negative hep B surface antibody immune at 51.1.  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  He retired  and did shots and he does not remember having the hep b.  These meds on suppress immune system. Can reactivate the hep. 2011 to now not reactivated but never know and also havign some ab and not as immune suppressed and now on steroids and new med.  Typically need a medicine to prevent this as if does reactivate and it does not usually clear as most people stay on this.  Some meds follow and do labs 1-3 m and treat as needed if low risk but this class is high risk.  HBV prevention meds have risk and need to follow labs as they can affect kidneys and bones can be issues.  He is also on steroids and if this persists then bone risk and the first line med for hep b tenofovir df may have more risk than tenofovir af that less bone and renal risk and have to consider,  Bone density not done and need one and we can can ask Namrata re this and if abn then we need to look at starting that cliff if to stay on steroids.  Older records: October 2015 hep B DNA not detected. Hep B surface antigen negative. He did the u.s at [facility] in june 2021 and we did not see that result.

## 2023-06-06 ENCOUNTER — TELEPHONE ENCOUNTER (OUTPATIENT)
Dept: URBAN - METROPOLITAN AREA CLINIC 86 | Facility: CLINIC | Age: 58
End: 2023-06-06

## 2023-06-06 NOTE — HPI-TODAY'S VISIT:
Hi,   You were recently scheduled for an office visit and missed or canceled the appointment. Please call the office to reschedule. [phone] extension [phone].  Iva Reeder PA-C

## 2023-06-07 ENCOUNTER — OFFICE VISIT (OUTPATIENT)
Dept: URBAN - METROPOLITAN AREA CLINIC 86 | Facility: CLINIC | Age: 58
End: 2023-06-07
Payer: MEDICARE

## 2023-06-07 VITALS
DIASTOLIC BLOOD PRESSURE: 89 MMHG | BODY MASS INDEX: 32.78 KG/M2 | TEMPERATURE: 97.1 F | HEIGHT: 70 IN | WEIGHT: 229 LBS | SYSTOLIC BLOOD PRESSURE: 125 MMHG | HEART RATE: 86 BPM

## 2023-06-07 DIAGNOSIS — K51.90 ULCERATIVE COLITIS: ICD-10-CM

## 2023-06-07 DIAGNOSIS — R63.4 WEIGHT LOSS: ICD-10-CM

## 2023-06-07 DIAGNOSIS — R79.89 ELEVATED SERUM CREATININE: ICD-10-CM

## 2023-06-07 DIAGNOSIS — R89.4 HEPATITIS B CORE ANTIBODY POSITIVE: ICD-10-CM

## 2023-06-07 DIAGNOSIS — R19.7 DIARRHEA: ICD-10-CM

## 2023-06-07 PROCEDURE — 99214 OFFICE O/P EST MOD 30 MIN: CPT | Performed by: PHYSICIAN ASSISTANT

## 2023-06-07 RX ORDER — TENOFOVIR DISPROXIL FUMARATE 300 MG/1
1 TABLET TABLET ORAL ONCE A DAY
Qty: 30 | Refills: 2 | Status: ACTIVE | COMMUNITY

## 2023-06-07 RX ORDER — COLCHICINE 0.6 MG/1
1 CAPSULE CAPSULE ORAL
Status: ACTIVE | COMMUNITY

## 2023-06-07 RX ORDER — OLMESARTAN MEDOXOMIL AND HYDROCHLOROTHIAZIDE 20/12.5 20; 12.5 MG/1; MG/1
1 TABLET TABLET ORAL ONCE A DAY
Status: ACTIVE | COMMUNITY

## 2023-06-07 RX ORDER — TENOFOVIR ALAFENAMIDE 25 MG/1
1 TABLET WITH FOOD TABLET ORAL ONCE A DAY
Qty: 30 TABLET | Refills: 2 | OUTPATIENT
Start: 2023-06-07 | End: 2023-09-05

## 2023-06-07 RX ORDER — UPADACITINIB 30 MG/1
TAKE 1 TABLET BY MOUTH 1 TIME A DAY TABLET, EXTENDED RELEASE ORAL
Qty: 30 | Refills: 6 | Status: ACTIVE | COMMUNITY

## 2023-06-07 RX ORDER — DULOXETINE HYDROCHLORIDE 60 MG/1
1 CAPSULE CAPSULE, DELAYED RELEASE ORAL ONCE A DAY
Status: ACTIVE | COMMUNITY

## 2023-06-07 NOTE — HPI-TODAY'S VISIT:
Patient is a 56-year-old male referred by Dr. Yonatan Silva Aug  2021 for evaluation of liver issue if the hep b core positive and on tx now for HBV prophylaxis for same due to isp.  A copy of the note will be sent to Dr. Yonatan Silva.  6/5/23 He is on the RINvoq now for colitis issues Hepatotoxicity In the prelicensure clinical trials of upadacitinib in patients with rheumatoid arthritis, liver test abnormalities were frequent although usually mild. Some degree of ALT elevation arose in up to 11% of upadacitinib treated patients compared to 7% treated with placebo, but were above 3 times the upper limit of normal (ULN) in 2% or less. Furthermore, similar rates of ALT elevations arose in patients treated with methotrexate or biologic DMARDs. In these trials that enrolled over 3000 patients, there were no reports of clinically apparent liver injury, serious cases of liver injury or liver-related deaths. Similarly, other IRIS inhibitors such as tofacitinib and baricitinib have been associated with frequent minor serum aminotransferase elevations during treatment, but episodes of clinically apparent liver injury have not been reported. Thus, this class of agents is suspected but not proven capable of causing liver injury.  In addition, long term treatment with upadacitinib and other Janus kinase inhibitors has been linked to rare instances of reactivation of hepatitis B that can be severe and has been linked to fatal outcomes. Reactivation can become clinically apparent after the JAK inhibitor is discontinued, when immune restoration results in an immunologic response to the heightened viral replication.  Likelihood score: D (possible, rare cause of clinically apparent liver injury including reactivation of hepatitis B in susceptible patients). Discussed the hep b and not on tx and need  discussed chol lab and can be due to the renvoq  Discussed the choletserol and while rinvoq can impact this also diet and disussed the rf for fatty lvier He was put on a cholesterol medication recently atorvastatin and we will follow this His cr is 1.62 and check today and will do vemlidy given this. discussed goal for ast and alt is less than 35. suspect up due to diet, hcol and will montior  recap January 13 ultrasound shows pancreas not well seen due to gas and body habitus.  Limited exam was unremarkable. Liver was mildly echogenic and with no discrete liver lesion. Liver vessels were patent as expected. They do see some echogenic foci in the gallbladder lumen with possible posterior acoustic shadowing which would make them consider that you may have gallstones and/or some sludge balls there.  That appears to be new versus your prior exam of June of last year. Common bile duct was 2 mm and that was within normal limits.  Right kidney was 9.6 cm with no hydronephrosis.  Spleen was normal at 11.3 cm. They suspect liver has some mild fatty infiltration and that you had these new gallstones versus sludge balls seen in the gallbladder lumen. In the prior study they mention a 1.2 cm echogenic lesion in the liver but they did not see this at this time. They recommended a 3 to 6-month follow-up right upper quadrant ultrasound and I would favor it being done at 3 months. I also noticed that you had a Dec 14 appt but no showed and you have not been rebooked to see us. Please call Sylvia at [phone] ext [phone] to be booked for an appt to see how you are doing and to review the plan and order this follow up. Thank you. Dr Harrington.     Nov 2021 labs: Hepatitis B surface antibody remains immune but it is slightly lower on the titer/level at 35 rather than 51.1 prior.  If drops to not detected, could give a booster dose of the hepatitis b vaccine for an amnestic response. Hep B quantitative DNA remains not detected. Glucose 99 BUN of 10 creatinine 1.22 and previously was 1.18 and prior to that 1.61.  It is currently in normal range at 1.22. Sodium 136 potassium 4.4 albumin 4.4 and calcium 9.9 total bilirubin 0.7 alkaline phosphatase 80 AST 17 and ALT 10 with ideal ALT less than 35 so doing well there. White blood cell count 6.8 hemoglobin 14.7 which is up from 13.1.  Platelet count normal at 236.  MCV normal at 84.  Neutrophils 3.7 and lymphocytes normal at 2.4. Dr. Harrington Dear Jerzy Hardy, August 5 labs show white blood cell count 8.8 RBC count slightly low at 4.09 with normal from 4.14 up to 5.8. Previously you were normal at 4.74 and 5.5 but clearly lower now. Please share with primary provider. Hemoglobin lower also at 13.1 from previous 14.5 and prior 16.8. Hemoglobin currently at the lower side of normal. Platelet count 318. Neutrophils normal at 4.4 with lymphocytes up a little bit at 3.5. Were you ill recently? Glucose 72 BUN down to 15 from 28. Creatinine down to 1.18 from 1.61. Sodium 140 potassium 4.8 calcium 9.8 albumin 3.9 bilirubin 0.4 alkaline phosphatase 85 AST 13 ALT 13. Previously AST 13 and ALT of 8. Hep B DNA not detected. So labs for liver better and creatinine and suspect prior diarrhea and colitis issues had affected him and dropped the hg. He says not bleeding now and bowels better. Pt says also had a cold and so maybe explains the lymphocytes being little up. Spoke with pt. Dr Harrington   June 23 2021 labs show hepatitis B immunity so you are protected. Hep B DNA not detected. Glucose 92 BUN of 28 creatinine is elevated at 1.61 and has varied from 1.23 up to 1.53 before. We need to look at your hydration status with these changes. The summer heat has been kicking up lately. Sodium 137 potassium 4.9 calcium 9.9 albumin 4.3 bilirubin 0.5 alkaline phosphatase 95 AST down to 13 ALT of 8 previously AST 29 ALT 17 so liver enzymes definitely lower. White blood cell count 7 hemoglobin 14.5 platelet count 315 MCV 94 neutrophils 3.4. Per notes on tenofovir df po qd and so still above 50. Egfr 55. Having diarrhea at the time and he says that it is better.  He did the 2nd entyvio now about 2 weeks ago and says starting to do better now.  He says the diarrhea not there now and some hemorrhodis.  Dr Silva saw recently with Namrata and they see him again sept. Did entyvio aug 23.  Aug 5 wbc 8.8 and hg 13.1 plat 318 and mcv 95 and neutrophils 4.4 and lymph 3.5 little up and glu 72 and cr 1.18 and na 140 and k 4.8 and cl 103 and c02 26 and alb 3.9 and tv 0.4 and alk 85 and ast and alt 13. HBV pending.  Recap: Patient was last seen back on Radha 3 by Namrata Pillai PA-C, for his noted hx of history of ulcerative colitis left-sided first noted in 2011. Previously followed by Dr. Rasmussen and few others . Also had seen Dr En Mattson as well prior.  Had been on Remicade 5 mg/kg every 8 weeks since 2011 and did not respond to oral treatment prior. No known complications or surgeries noted.  Patient on the Remicade had been much better but never was in clinical remission and still with persistent joint pains.  Patient in the fall 2020 was noted flaring and a Prometheus panel showed undetected Remicade levels and presence of antibodies.  He was transitioned to Humira in November 2020 on a every 2-week basis and is being followed for this. He says when getting higher dose doing beter and then more issues when lower.  During induction he felt well but with maintenance started had some increase in symptoms. 1 pen misfired in January. Noted to have some hematochezia in January and then more increased bright red blood per rectum with bowel movements. 10 to 15 bms per day. Patient lost 7 pounds.  Patient started on prednisone in June at 10 mg a day with some mild improvement during the day but still with nocturnal stools.  He was changed to budesonide 9mg a day and on that until 3rd dose. Also to do a suppository and says had issues when had diarrhea and doing better now also.  BM post meals 4 a day and most solid now. occ runny. Prior more watery.  Colonoscopy 2019 with 1 cm transverse colon adenoma.  August 2020 colonoscopy mild inflammation up to 15 cm from anal verge biopsies showed chronic active proctitis. Remaining colon normal. He stopped herbals. Only onprobiotic and only on culturelle a day.  Prior pt listed as taking some herbals specifically a papaya enzyme extract, some glucosamine sulfate, aloe vera, omega-3 as well as a probiotic. Also noted to be on allopurinol and colchicine for gout.  Weight noted to be 28.55 BMI.  Dec 2020 Liver enzymes show bilirubin 0.8 alkaline phosphatase 65 AST 29 ALT 17. White cell count 5.7 hemoglobin 60.8 plate count 224. Glucose 92 BUN of 17 creatinine 1.23 sodium 140 potassium 4.3. Patient had hepatitis as well as quantiferon on Radha 3 and was indeterminant and to get cxr for tb. No symptoms from this. October 2020 labs showed QuantiFERON gold plus was negative. White cell count 5.7 hemoglobin 16.8 platelet count 224 back in December 2020 AST then 29 ALT 17 alk phos 65 total bilirubin 0.8 sodium 140 potassium 4.3 glucose 92 BUN of 17 creatinine 1.23. Vitamin D level 86.6. June 2021 labs show hep B surface antigen negative hep B surface antibody immune at 51.1.  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  He retired  and did shots and he does not remember having the hep b.  These meds on suppress immune system. Can reactivate the hep. 2011 to now not reactivated but never know and also havign some ab and not as immune suppressed and now on steroids and new med.  Typically need a medicine to prevent this as if does reactivate and it does not usually clear as most people stay on this.  Some meds follow and do labs 1-3 m and treat as needed if low risk but this class is high risk.  HBV prevention meds have risk and need to follow labs as they can affect kidneys and bones can be issues.  He is also on steroids and if this persists then bone risk and the first line med for hep b tenofovir df may have more risk than tenofovir af that less bone and renal risk and have to consider,  Bone density not done and need one and we can can ask Namrata re this and if abn then we need to look at starting that cliff if to stay on steroids.  Older records: October 2015 hep B DNA not detected. Hep B surface antigen negative. He did the u.s at [facility] in june 2021 and we did not see that result.

## 2023-06-10 LAB
A/G RATIO: 1.3
ABSOLUTE BASOPHILS: 10
ABSOLUTE EOSINOPHILS: 10
ABSOLUTE LYMPHOCYTES: 1803
ABSOLUTE MONOCYTES: 407
ABSOLUTE NEUTROPHILS: 2671
ALBUMIN: 4.4
ALKALINE PHOSPHATASE: 49
ALT (SGPT): 36
AST (SGOT): 44
BASOPHILS: 0.2
BILIRUBIN, TOTAL: 0.5
BUN/CREATININE RATIO: 13
BUN: 19
CALCIUM: 9.8
CARBON DIOXIDE, TOTAL: 28
CHLORIDE: 101
CREATININE: 1.44
EGFR: 57
EOSINOPHILS: 0.2
GLOBULIN, TOTAL: 3.3
GLUCOSE: 86
HEMATOCRIT: 41.7
HEMOGLOBIN: 14.6
HEPATITIS B SURFACE AB IMMUNITY, QN: 628
HEPATITIS B SURFACE ANTIGEN: (no result)
HEPATITIS B VIRUS DNA: <1
HEPATITIS B VIRUS DNA: <10
LYMPHOCYTES: 36.8
MCH: 33.5
MCHC: 35
MCV: 95.6
MONOCYTES: 8.3
MPV: 9.8
NEUTROPHILS: 54.5
PLATELET COUNT: 329
POTASSIUM: 4.9
PROTEIN, TOTAL: 7.7
RDW: 13.3
RED BLOOD CELL COUNT: 4.36
SODIUM: 136
WHITE BLOOD CELL COUNT: 4.9

## 2023-06-12 ENCOUNTER — TELEPHONE ENCOUNTER (OUTPATIENT)
Dept: URBAN - METROPOLITAN AREA CLINIC 86 | Facility: CLINIC | Age: 58
End: 2023-06-12

## 2023-06-12 NOTE — HPI-TODAY'S VISIT:
Dear Jerzy Hardy,  The hepatitis b immunity level is 628, this is consistent with immunity to hepatitis b. The creatinine 1.44, elevated and please share with the primary care. The sodium 136, potassium 4.9, bilirubin 0.5, ast 44, previously 42, alt 36 previously 41. Goal for the ast/alt is less than 35. HBV dna is listed as detected but not quantifiable. Curious if this is a false positive test as previously in May 2023 this was not detected and you have immunity to the virus which is protected and the surface antigen is negative therefore should not have any virus. We will need to check this again. I'll include a lab slip with this and you can check it again locally. The complete blood count is normal other than the eosinophils are low and recommend sharing this with the primary care. Please be sure to do the lab include with this letter and I will follow up with that result.  Iva Reeder PA-C

## 2023-06-21 ENCOUNTER — LAB OUTSIDE AN ENCOUNTER (OUTPATIENT)
Dept: URBAN - METROPOLITAN AREA CLINIC 86 | Facility: CLINIC | Age: 58
End: 2023-06-21

## 2023-07-05 ENCOUNTER — LAB OUTSIDE AN ENCOUNTER (OUTPATIENT)
Dept: URBAN - METROPOLITAN AREA CLINIC 92 | Facility: CLINIC | Age: 58
End: 2023-07-05

## 2023-07-05 ENCOUNTER — LAB OUTSIDE AN ENCOUNTER (OUTPATIENT)
Dept: URBAN - METROPOLITAN AREA CLINIC 86 | Facility: CLINIC | Age: 58
End: 2023-07-05

## 2023-07-06 ENCOUNTER — TELEPHONE ENCOUNTER (OUTPATIENT)
Dept: URBAN - METROPOLITAN AREA CLINIC 86 | Facility: CLINIC | Age: 58
End: 2023-07-06

## 2023-07-06 LAB
ALBUMIN/GLOBULIN RATIO: 1.3
ALBUMIN: 4.3
ALKALINE PHOSPHATASE: 50
ALT (SGPT): 31
AST (SGOT): 34
BILIRUBIN, DIRECT: 0.2
BILIRUBIN, INDIRECT: 0.5
BILIRUBIN, TOTAL: 0.7
GLOBULIN: 3.2
PROTEIN, TOTAL: 7.5

## 2023-07-06 RX ORDER — TENOFOVIR ALAFENAMIDE 25 MG/1
1 TABLET WITH FOOD TABLET ORAL ONCE A DAY
Qty: 30 TABLET | Refills: 2
Start: 2023-06-07 | End: 2023-10-04

## 2023-07-19 ENCOUNTER — LAB OUTSIDE AN ENCOUNTER (OUTPATIENT)
Dept: URBAN - METROPOLITAN AREA CLINIC 86 | Facility: CLINIC | Age: 58
End: 2023-07-19

## 2023-07-19 ENCOUNTER — OFFICE VISIT (OUTPATIENT)
Dept: URBAN - METROPOLITAN AREA CLINIC 79 | Facility: CLINIC | Age: 58
End: 2023-07-19
Payer: MEDICARE

## 2023-07-19 DIAGNOSIS — K76.0 FATTY (CHANGE OF) LIVER: ICD-10-CM

## 2023-07-19 DIAGNOSIS — K82.8 GALLBLADDER SLUDGE: ICD-10-CM

## 2023-07-19 DIAGNOSIS — K80.20 GALLSTONE: ICD-10-CM

## 2023-07-19 PROCEDURE — 76705 ECHO EXAM OF ABDOMEN: CPT

## 2023-07-19 PROCEDURE — 93975 VASCULAR STUDY: CPT

## 2023-07-19 RX ORDER — OLMESARTAN MEDOXOMIL AND HYDROCHLOROTHIAZIDE 20/12.5 20; 12.5 MG/1; MG/1
1 TABLET TABLET ORAL ONCE A DAY
Status: ACTIVE | COMMUNITY

## 2023-07-19 RX ORDER — UPADACITINIB 30 MG/1
TAKE 1 TABLET BY MOUTH 1 TIME A DAY TABLET, EXTENDED RELEASE ORAL
Qty: 30 | Refills: 6 | Status: ACTIVE | COMMUNITY

## 2023-07-19 RX ORDER — DULOXETINE HYDROCHLORIDE 60 MG/1
1 CAPSULE CAPSULE, DELAYED RELEASE ORAL ONCE A DAY
Status: ACTIVE | COMMUNITY

## 2023-07-19 RX ORDER — COLCHICINE 0.6 MG/1
1 CAPSULE CAPSULE ORAL
Status: ACTIVE | COMMUNITY

## 2023-07-19 RX ORDER — TENOFOVIR ALAFENAMIDE 25 MG/1
1 TABLET WITH FOOD TABLET ORAL ONCE A DAY
Qty: 30 TABLET | Refills: 2 | Status: ACTIVE | COMMUNITY
Start: 2023-06-07 | End: 2023-10-04

## 2023-07-19 RX ORDER — TENOFOVIR DISPROXIL FUMARATE 300 MG/1
1 TABLET TABLET ORAL ONCE A DAY
Qty: 30 | Refills: 2 | Status: ACTIVE | COMMUNITY

## 2023-07-20 ENCOUNTER — TELEPHONE ENCOUNTER (OUTPATIENT)
Dept: URBAN - METROPOLITAN AREA CLINIC 86 | Facility: CLINIC | Age: 58
End: 2023-07-20

## 2023-07-20 NOTE — HPI-TODAY'S VISIT:
Dear Jerzy Hardy,  The July 19, 2023 ultrasound was sent to me.  The liver with diffuse increased echogenicity suggesting fatty liver.  No lesions.  The common bile duct was 3 mm and they thought they saw gallstones within the gallbladder.  The right kidney is 9.3 cm and appears normal.  Spleen 9.7 cm and appears normal.  The hepatic vasculature is patent.  Overall they saw fatty liver and as discussed the treatment for this is diet, exercise, and weight loss.  We will review this at the follow-up.  Iva Reeder PA-C

## 2023-07-23 LAB
HBV LOG10: NOT DETECTED
HEPATITIS B VIRUS DNA: NOT DETECTED
HEPATITIS B VIRUS DNA: NOT DETECTED

## 2023-07-25 ENCOUNTER — TELEPHONE ENCOUNTER (OUTPATIENT)
Dept: URBAN - METROPOLITAN AREA CLINIC 92 | Facility: CLINIC | Age: 58
End: 2023-07-25

## 2023-07-26 ENCOUNTER — OFFICE VISIT (OUTPATIENT)
Dept: URBAN - METROPOLITAN AREA CLINIC 86 | Facility: CLINIC | Age: 58
End: 2023-07-26
Payer: MEDICARE

## 2023-07-26 VITALS
TEMPERATURE: 97 F | HEART RATE: 94 BPM | BODY MASS INDEX: 33.5 KG/M2 | DIASTOLIC BLOOD PRESSURE: 81 MMHG | HEIGHT: 70 IN | SYSTOLIC BLOOD PRESSURE: 115 MMHG | WEIGHT: 234 LBS

## 2023-07-26 DIAGNOSIS — K51.80 CHRONIC PANCOLONIC ULCERATIVE COLITIS: ICD-10-CM

## 2023-07-26 DIAGNOSIS — R19.7 DIARRHEA: ICD-10-CM

## 2023-07-26 DIAGNOSIS — E66.9 OBESITY (BMI 30-39.9): ICD-10-CM

## 2023-07-26 DIAGNOSIS — Z79.899 HIGH RISK MEDICATION USE: ICD-10-CM

## 2023-07-26 DIAGNOSIS — R89.4 HEPATITIS B CORE ANTIBODY POSITIVE: ICD-10-CM

## 2023-07-26 DIAGNOSIS — R63.4 WEIGHT LOSS: ICD-10-CM

## 2023-07-26 DIAGNOSIS — R76.11 ABNORMAL REACTION TO TUBERCULIN TEST: ICD-10-CM

## 2023-07-26 DIAGNOSIS — R79.89 ELEVATED SERUM CREATININE: ICD-10-CM

## 2023-07-26 DIAGNOSIS — Z71.89 VACCINE COUNSELING: ICD-10-CM

## 2023-07-26 PROCEDURE — 99214 OFFICE O/P EST MOD 30 MIN: CPT

## 2023-07-26 RX ORDER — TENOFOVIR ALAFENAMIDE 25 MG/1
1 TABLET WITH FOOD TABLET ORAL ONCE A DAY
Qty: 30 TABLET | Refills: 2 | Status: ACTIVE | COMMUNITY
Start: 2023-06-07 | End: 2023-10-04

## 2023-07-26 RX ORDER — UPADACITINIB 30 MG/1
TAKE 1 TABLET BY MOUTH 1 TIME A DAY TABLET, EXTENDED RELEASE ORAL
Qty: 30 | Refills: 6 | Status: ACTIVE | COMMUNITY

## 2023-07-26 RX ORDER — TENOFOVIR ALAFENAMIDE 25 MG/1
1 TABLET WITH FOOD TABLET ORAL ONCE A DAY
Qty: 30 TABLET | Refills: 2 | OUTPATIENT

## 2023-07-26 RX ORDER — COLCHICINE 0.6 MG/1
1 CAPSULE CAPSULE ORAL
Status: ACTIVE | COMMUNITY

## 2023-07-26 RX ORDER — DULOXETINE HYDROCHLORIDE 60 MG/1
1 CAPSULE CAPSULE, DELAYED RELEASE ORAL ONCE A DAY
Status: ACTIVE | COMMUNITY

## 2023-07-26 RX ORDER — OLMESARTAN MEDOXOMIL AND HYDROCHLOROTHIAZIDE 20/12.5 20; 12.5 MG/1; MG/1
1 TABLET TABLET ORAL ONCE A DAY
Status: ACTIVE | COMMUNITY

## 2023-07-26 NOTE — HPI-TODAY'S VISIT:
Patient is a 56-year-old male referred by Dr. Yonatan Silva Aug  2021 for evaluation of liver issue if the hep b core positive and on tx now for HBV prophylaxis for same due to isp.  A copy of the note will be sent to Dr. Yonatan Silva.  7/25/23 ov   The July 19, 2023 ultrasound was sent to me.  The liver with diffuse increased echogenicity suggesting fatty liver.  No lesions.  The common bile duct was 3 mm and they thought they saw gallstones within the gallbladder.  The right kidney is 9.3 cm and appears normal.  Spleen 9.7 cm and appears normal.  The hepatic vasculature is patent.  Overall they saw fatty liver and as discussed the treatment for this is diet, exercise, and weight loss.  We will review this at the follow-up.  The hepatitis b immunity level is 628, this is consistent with immunity to hepatitis b. The creatinine 1.44, elevated and please share with the primary care. The sodium 136, potassium 4.9, bilirubin 0.5, ast 44, previously 42, alt 36 previously 41. Goal for the ast/alt is less than 35. HBV dna is listed as detected but not quantifiable. Curious if this is a false positive test as previously in May 2023 this was not detected and you have immunity to the virus which is protected and the surface antigen is negative therefore should not have any virus. We will need to check this again. I'll include a lab slip with this and you can check it again locally. The complete blood count is normal other than the eosinophils are low and recommend sharing this with the primary care. Please be sure to do the lab include with this letter and I will follow up with that result.  7/19/23 hbv not detected and hepatic panel done and ast 34 alt 31. clearly the previous lab was error. the ast and alt improved and asked what changed he has been working on diet and avoiding fried foods HE has been taking the vemlidy , gets [facility]   recap He is on the RINvoq now for colitis issues Hepatotoxicity In the prelicensure clinical trials of upadacitinib in patients with rheumatoid arthritis, liver test abnormalities were frequent although usually mild. Some degree of ALT elevation arose in up to 11% of upadacitinib treated patients compared to 7% treated with placebo, but were above 3 times the upper limit of normal (ULN) in 2% or less. Furthermore, similar rates of ALT elevations arose in patients treated with methotrexate or biologic DMARDs. In these trials that enrolled over 3000 patients, there were no reports of clinically apparent liver injury, serious cases of liver injury or liver-related deaths. Similarly, other IRIS inhibitors such as tofacitinib and baricitinib have been associated with frequent minor serum aminotransferase elevations during treatment, but episodes of clinically apparent liver injury have not been reported. Thus, this class of agents is suspected but not proven capable of causing liver injury.  In addition, long term treatment with upadacitinib and other Janus kinase inhibitors has been linked to rare instances of reactivation of hepatitis B that can be severe and has been linked to fatal outcomes. Reactivation can become clinically apparent after the JAK inhibitor is discontinued, when immune restoration results in an immunologic response to the heightened viral replication.  Likelihood score: D (possible, rare cause of clinically apparent liver injury including reactivation of hepatitis B in susceptible patients). Discussed the hep b and not on tx and need  discussed chol lab and can be due to the renvoq  Discussed the choletserol and while rinvoq can impact this also diet and disussed the rf for fatty lvier He was put on a cholesterol medication recently atorvastatin and we will follow this His cr is 1.62 and check today and will do vemlidy given this. discussed goal for ast and alt is less than 35. suspect up due to diet, hcol and will montior  recap January 13 ultrasound shows pancreas not well seen due to gas and body habitus.  Limited exam was unremarkable. Liver was mildly echogenic and with no discrete liver lesion. Liver vessels were patent as expected. They do see some echogenic foci in the gallbladder lumen with possible posterior acoustic shadowing which would make them consider that you may have gallstones and/or some sludge balls there.  That appears to be new versus your prior exam of June of last year. Common bile duct was 2 mm and that was within normal limits.  Right kidney was 9.6 cm with no hydronephrosis.  Spleen was normal at 11.3 cm. They suspect liver has some mild fatty infiltration and that you had these new gallstones versus sludge balls seen in the gallbladder lumen. In the prior study they mention a 1.2 cm echogenic lesion in the liver but they did not see this at this time. They recommended a 3 to 6-month follow-up right upper quadrant ultrasound and I would favor it being done at 3 months. I also noticed that you had a Dec 14 appt but no showed and you have not been rebooked to see us. Please call Sylvia at [phone] ext [phone] to be booked for an appt to see how you are doing and to review the plan and order this follow up. Thank you. Dr Harrington.     Nov 2021 labs: Hepatitis B surface antibody remains immune but it is slightly lower on the titer/level at 35 rather than 51.1 prior.  If drops to not detected, could give a booster dose of the hepatitis b vaccine for an amnestic response. Hep B quantitative DNA remains not detected. Glucose 99 BUN of 10 creatinine 1.22 and previously was 1.18 and prior to that 1.61.  It is currently in normal range at 1.22. Sodium 136 potassium 4.4 albumin 4.4 and calcium 9.9 total bilirubin 0.7 alkaline phosphatase 80 AST 17 and ALT 10 with ideal ALT less than 35 so doing well there. White blood cell count 6.8 hemoglobin 14.7 which is up from 13.1.  Platelet count normal at 236.  MCV normal at 84.  Neutrophils 3.7 and lymphocytes normal at 2.4. Dr. Harrington Dear Jerzy Hardy, August 5 labs show white blood cell count 8.8 RBC count slightly low at 4.09 with normal from 4.14 up to 5.8. Previously you were normal at 4.74 and 5.5 but clearly lower now. Please share with primary provider. Hemoglobin lower also at 13.1 from previous 14.5 and prior 16.8. Hemoglobin currently at the lower side of normal. Platelet count 318. Neutrophils normal at 4.4 with lymphocytes up a little bit at 3.5. Were you ill recently? Glucose 72 BUN down to 15 from 28. Creatinine down to 1.18 from 1.61. Sodium 140 potassium 4.8 calcium 9.8 albumin 3.9 bilirubin 0.4 alkaline phosphatase 85 AST 13 ALT 13. Previously AST 13 and ALT of 8. Hep B DNA not detected. So labs for liver better and creatinine and suspect prior diarrhea and colitis issues had affected him and dropped the hg. He says not bleeding now and bowels better. Pt says also had a cold and so maybe explains the lymphocytes being little up. Spoke with pt. Dr Harrington   June 23 2021 labs show hepatitis B immunity so you are protected. Hep B DNA not detected. Glucose 92 BUN of 28 creatinine is elevated at 1.61 and has varied from 1.23 up to 1.53 before. We need to look at your hydration status with these changes. The summer heat has been kicking up lately. Sodium 137 potassium 4.9 calcium 9.9 albumin 4.3 bilirubin 0.5 alkaline phosphatase 95 AST down to 13 ALT of 8 previously AST 29 ALT 17 so liver enzymes definitely lower. White blood cell count 7 hemoglobin 14.5 platelet count 315 MCV 94 neutrophils 3.4. Per notes on tenofovir df po qd and so still above 50. Egfr 55. Having diarrhea at the time and he says that it is better.  He did the 2nd entyvio now about 2 weeks ago and says starting to do better now.  He says the diarrhea not there now and some hemorrhodis.  Dr Silva saw recently with Namrata and they see him again sept. Did entyvio aug 23.  Aug 5 wbc 8.8 and hg 13.1 plat 318 and mcv 95 and neutrophils 4.4 and lymph 3.5 little up and glu 72 and cr 1.18 and na 140 and k 4.8 and cl 103 and c02 26 and alb 3.9 and tv 0.4 and alk 85 and ast and alt 13. HBV pending.  Recap: Patient was last seen back on Radha 3 by Namrata Pillai PA-C, for his noted hx of history of ulcerative colitis left-sided first noted in 2011. Previously followed by Dr. Rasmussen and few others . Also had seen Dr En Mattson as well prior.  Had been on Remicade 5 mg/kg every 8 weeks since 2011 and did not respond to oral treatment prior. No known complications or surgeries noted.  Patient on the Remicade had been much better but never was in clinical remission and still with persistent joint pains.  Patient in the fall 2020 was noted flaring and a [facility] panel showed undetected Remicade levels and presence of antibodies.  He was transitioned to Humira in November 2020 on a every 2-week basis and is being followed for this. He says when getting higher dose doing beter and then more issues when lower.  During induction he felt well but with maintenance started had some increase in symptoms. 1 pen misfired in January. Noted to have some hematochezia in January and then more increased bright red blood per rectum with bowel movements. 10 to 15 bms per day. Patient lost 7 pounds.  Patient started on prednisone in June at 10 mg a day with some mild improvement during the day but still with nocturnal stools.  He was changed to budesonide 9mg a day and on that until 3rd dose. Also to do a suppository and says had issues when had diarrhea and doing better now also.  BM post meals 4 a day and most solid now. occ runny. Prior more watery.  Colonoscopy 2019 with 1 cm transverse colon adenoma.  August 2020 colonoscopy mild inflammation up to 15 cm from anal verge biopsies showed chronic active proctitis. Remaining colon normal. He stopped herbals. Only onprobiotic and only on culturelle a day.  Prior pt listed as taking some herbals specifically a papaya enzyme extract, some glucosamine sulfate, aloe vera, omega-3 as well as a probiotic. Also noted to be on allopurinol and colchicine for gout.  Weight noted to be 28.55 BMI.  Dec 2020 Liver enzymes show bilirubin 0.8 alkaline phosphatase 65 AST 29 ALT 17. White cell count 5.7 hemoglobin 60.8 plate count 224. Glucose 92 BUN of 17 creatinine 1.23 sodium 140 potassium 4.3. Patient had hepatitis as well as quantiferon on Radha 3 and was indeterminant and to get cxr for tb. No symptoms from this. October 2020 labs showed QuantiFERON gold plus was negative. White cell count 5.7 hemoglobin 16.8 platelet count 224 back in December 2020 AST then 29 ALT 17 alk phos 65 total bilirubin 0.8 sodium 140 potassium 4.3 glucose 92 BUN of 17 creatinine 1.23. Vitamin D level 86.6. June 2021 labs show hep B surface antigen negative hep B surface antibody immune at 51.1.  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  He retired  and did shots and he does not remember having the hep b.  These meds on suppress immune system. Can reactivate the hep. 2011 to now not reactivated but never know and also havign some ab and not as immune suppressed and now on steroids and new med.  Typically need a medicine to prevent this as if does reactivate and it does not usually clear as most people stay on this.  Some meds follow and do labs 1-3 m and treat as needed if low risk but this class is high risk.  HBV prevention meds have risk and need to follow labs as they can affect kidneys and bones can be issues.  He is also on steroids and if this persists then bone risk and the first line med for hep b tenofovir df may have more risk than tenofovir af that less bone and renal risk and have to consider,  Bone density not done and need one and we can can ask Namrata re this and if abn then we need to look at starting that cliff if to stay on steroids.  Older records: October 2015 hep B DNA not detected. Hep B surface antigen negative. He did the u[facility]s at [facility] in june 2021 and we did not see that result.

## 2023-08-07 ENCOUNTER — TELEPHONE ENCOUNTER (OUTPATIENT)
Dept: URBAN - METROPOLITAN AREA CLINIC 92 | Facility: CLINIC | Age: 58
End: 2023-08-07

## 2023-10-11 ENCOUNTER — ERX REFILL RESPONSE (OUTPATIENT)
Dept: URBAN - METROPOLITAN AREA CLINIC 86 | Facility: CLINIC | Age: 58
End: 2023-10-11

## 2023-10-11 RX ORDER — TENOFOVIR ALAFENAMIDE 25 MG/1
TAKE 1 TABLET BY MOUTH 1 TIME A DAY WITH FOOD TABLET ORAL
Qty: 30 TABLET | Refills: 2 | OUTPATIENT

## 2023-10-11 RX ORDER — TENOFOVIR ALAFENAMIDE 25 MG/1
1 TABLET WITH FOOD TABLET ORAL ONCE A DAY
Qty: 30 TABLET | Refills: 2 | OUTPATIENT

## 2023-10-19 ENCOUNTER — LAB OUTSIDE AN ENCOUNTER (OUTPATIENT)
Dept: URBAN - METROPOLITAN AREA CLINIC 86 | Facility: CLINIC | Age: 58
End: 2023-10-19

## 2023-10-23 ENCOUNTER — TELEPHONE ENCOUNTER (OUTPATIENT)
Dept: URBAN - METROPOLITAN AREA CLINIC 86 | Facility: CLINIC | Age: 58
End: 2023-10-23

## 2023-10-23 LAB
A/G RATIO: 1.6
ABSOLUTE BASOPHILS: 7
ABSOLUTE EOSINOPHILS: 7
ABSOLUTE LYMPHOCYTES: 903
ABSOLUTE MONOCYTES: 644
ABSOLUTE NEUTROPHILS: 5439
ALBUMIN: 4.4
ALKALINE PHOSPHATASE: 80
ALT (SGPT): 84
AST (SGOT): 55
BASOPHILS: 0.1
BILIRUBIN, TOTAL: 0.5
BUN/CREATININE RATIO: 20
BUN: 31
CALCIUM: 9.1
CARBON DIOXIDE, TOTAL: 28
CHLORIDE: 102
CREATININE: 1.56
EGFR: 52
EOSINOPHILS: 0.1
GLOBULIN, TOTAL: 2.8
GLUCOSE: 88
HBV LOG10: (no result)
HEMATOCRIT: 44.3
HEMOGLOBIN: 14.9
HEPATITIS B SURFACE ANTIGEN: (no result)
HEPATITIS B VIRUS DNA: (no result)
LYMPHOCYTES: 12.9
MCH: 32.3
MCHC: 33.6
MCV: 96.1
MONOCYTES: 9.2
MPV: 9.3
NEUTROPHILS: 77.7
PLATELET COUNT: 380
POTASSIUM: 4.3
PROTEIN, TOTAL: 7.2
RDW: 13.7
RED BLOOD CELL COUNT: 4.61
SODIUM: 137
WHITE BLOOD CELL COUNT: 7

## 2023-10-23 NOTE — HPI-TODAY'S VISIT:
Dear Jerzy Hardy,  The October 19 labs were sent to me.  The complete blood count overall is normal other than the eosinophils are slightly low and I would recommend sharing that with your primary care.  The creatinine or kidney function elevated at 1.56 and I would also recommend sharing this with the primary care.  Previously back in June of this year was 1.44.  The bilirubin 0.5, alkaline phosphatase 80, AST 55, ALT 54.  Back in June the AST was 44 and the ALT was 36.  What changed?  The hepatitis B DNA is listed as being less than 10 but being detected still having missed any doses of the medication?  It could be that these labs are very sensitive.  This still does not necessarily explain why the labs are high.  Has the diet been bad?  Any recent illness?  Please let me know. Iva Reeder PA-C

## 2023-10-26 ENCOUNTER — OFFICE VISIT (OUTPATIENT)
Dept: URBAN - METROPOLITAN AREA TELEHEALTH 2 | Facility: TELEHEALTH | Age: 58
End: 2023-10-26
Payer: MEDICARE

## 2023-10-26 VITALS — WEIGHT: 224 LBS | BODY MASS INDEX: 32.07 KG/M2 | HEIGHT: 70 IN

## 2023-10-26 DIAGNOSIS — R63.4 WEIGHT LOSS: ICD-10-CM

## 2023-10-26 DIAGNOSIS — R19.7 DIARRHEA: ICD-10-CM

## 2023-10-26 DIAGNOSIS — K76.0 FATTY LIVER: ICD-10-CM

## 2023-10-26 DIAGNOSIS — K51.80 CHRONIC PANCOLONIC ULCERATIVE COLITIS: ICD-10-CM

## 2023-10-26 PROBLEM — 197321007: Status: ACTIVE | Noted: 2023-10-26

## 2023-10-26 PROCEDURE — 99214 OFFICE O/P EST MOD 30 MIN: CPT | Performed by: PHYSICIAN ASSISTANT

## 2023-10-26 RX ORDER — COLCHICINE 0.6 MG/1
1 CAPSULE CAPSULE ORAL
Status: ACTIVE | COMMUNITY

## 2023-10-26 RX ORDER — DULOXETINE HYDROCHLORIDE 60 MG/1
1 CAPSULE CAPSULE, DELAYED RELEASE ORAL ONCE A DAY
Status: ACTIVE | COMMUNITY

## 2023-10-26 RX ORDER — UPADACITINIB 30 MG/1
TAKE 1 TABLET BY MOUTH 1 TIME A DAY TABLET, EXTENDED RELEASE ORAL
Qty: 30 | Refills: 6 | Status: ACTIVE | COMMUNITY

## 2023-10-26 RX ORDER — TENOFOVIR ALAFENAMIDE 25 MG/1
TAKE 1 TABLET BY MOUTH 1 TIME A DAY WITH FOOD TABLET ORAL
Qty: 30 TABLET | Refills: 2 | Status: ACTIVE | COMMUNITY

## 2023-10-26 RX ORDER — TENOFOVIR ALAFENAMIDE 25 MG/1
1 TABLET WITH FOOD TABLET ORAL ONCE A DAY
Qty: 30 TABLET | Refills: 2 | OUTPATIENT

## 2023-10-26 RX ORDER — OLMESARTAN MEDOXOMIL AND HYDROCHLOROTHIAZIDE 20/12.5 20; 12.5 MG/1; MG/1
1 TABLET TABLET ORAL ONCE A DAY
Status: ACTIVE | COMMUNITY

## 2023-10-26 NOTE — HPI-TODAY'S VISIT:
Patient is a 56-year-old male referred by Dr. Yonatan Silva Aug  2021 for evaluation of liver issue if the hep b core positive and on tx now for HBV prophylaxis for same due to isp.  A copy of the note will be sent to Dr. Yonatan Silva.   10/26/23 telemed 10/19/23 labs The complete blood count overall is normal other than the eosinophils are slightly low and I would recommend sharing that with your primary care. The creatinine or kidney function elevated at 1.56 and I would also recommend sharing this with the primary care. Previously back in June of this year was 1.44. The bilirubin 0.5, alkaline phosphatase 80, AST 55, ALT 54. Back in June the AST was 44 and the ALT was 36. What changed? The hepatitis B DNA is listed as being less than 10 but being detected still having missed any doses of the medication? It could be that these labs are very sensitive. This still does not necessarily explain why the labs are high. Has the diet been bad? Any recent illness? Please let me know. Iva Reeder PA-C   he missed 3-4 doses. med delayed  he had covid at the end of sept/beginning of oct and then had paxlovid so could explain the bump   RECAP 7/25/23 ov   The July 19, 2023 ultrasound was sent to me.  The liver with diffuse increased echogenicity suggesting fatty liver.  No lesions.  The common bile duct was 3 mm and they thought they saw gallstones within the gallbladder.  The right kidney is 9.3 cm and appears normal.  Spleen 9.7 cm and appears normal.  The hepatic vasculature is patent.  Overall they saw fatty liver and as discussed the treatment for this is diet, exercise, and weight loss.  We will review this at the follow-up.   The creatinine 1.44, elevated and please share with the primary care. The sodium 136, potassium 4.9, bilirubin 0.5, ast 44, previously 42, alt 36 previously 41. Goal for the ast/alt is less than 35. HBV dna is listed as detected but not quantifiable. Curious if this is a false positive test as previously in May 2023 this was not detected and you have immunity to the virus which is protected and the surface antigen is negative therefore should not have any virus. We will need to check this again. I'll include a lab slip with this and you can check it again locally. The complete blood count is normal other than the eosinophils are low and recommend sharing this with the primary care. Please be sure to do the lab include with this letter and I will follow up with that result.  7/19/23 hbv not detected and hepatic panel done and ast 34 alt 31. clearly the previous lab was error. the ast and alt improved and asked what changed he has been working on diet and avoiding fried foods HE has been taking the vemlidy , gets from [facility]   He is on the RINvoq now for colitis issues Hepatotoxicity In the prelicensure clinical trials of upadacitinib in patients with rheumatoid arthritis, liver test abnormalities were frequent although usually mild. Some degree of ALT elevation arose in up to 11% of upadacitinib treated patients compared to 7% treated with placebo, but were above 3 times the upper limit of normal (ULN) in 2% or less. Furthermore, similar rates of ALT elevations arose in patients treated with methotrexate or biologic DMARDs. In these trials that enrolled over 3000 patients, there were no reports of clinically apparent liver injury, serious cases of liver injury or liver-related deaths. Similarly, other IRIS inhibitors such as tofacitinib and baricitinib have been associated with frequent minor serum aminotransferase elevations during treatment, but episodes of clinically apparent liver injury have not been reported. Thus, this class of agents is suspected but not proven capable of causing liver injury.  In addition, long term treatment with upadacitinib and other Janus kinase inhibitors has been linked to rare instances of reactivation of hepatitis B that can be severe and has been linked to fatal outcomes. Reactivation can become clinically apparent after the JAK inhibitor is discontinued, when immune restoration results in an immunologic response to the heightened viral replication.  Likelihood score: D (possible, rare cause of clinically apparent liver injury including reactivation of hepatitis B in susceptible patients). Discussed the hep b and not on tx and need  discussed chol lab and can be due to the renvoq  Discussed the choletserol and while rinvoq can impact this also diet and disussed the rf for fatty lvier He was put on a cholesterol medication recently atorvastatin and we will follow this His cr is 1.62 and check today and will do vemlidy given this. discussed goal for ast and alt is less than 35. suspect up due to diet, hcol and will montior  recap January 13 ultrasound shows pancreas not well seen due to gas and body habitus.  Limited exam was unremarkable. Liver was mildly echogenic and with no discrete liver lesion. Liver vessels were patent as expected. They do see some echogenic foci in the gallbladder lumen with possible posterior acoustic shadowing which would make them consider that you may have gallstones and/or some sludge balls there.  That appears to be new versus your prior exam of June of last year. Common bile duct was 2 mm and that was within normal limits.  Right kidney was 9.6 cm with no hydronephrosis.  Spleen was normal at 11.3 cm. They suspect liver has some mild fatty infiltration and that you had these new gallstones versus sludge balls seen in the gallbladder lumen. In the prior study they mention a 1.2 cm echogenic lesion in the liver but they did not see this at this time. They recommended a 3 to 6-month follow-up right upper quadrant ultrasound and I would favor it being done at 3 months. I also noticed that you had a Dec 14 appt but no showed and you have not been rebooked to see us. Please call Sylvia at [phone] ext [phone] to be booked for an appt to see how you are doing and to review the plan and order this follow up. Thank you. Dr Harrington.     Nov 2021 labs: Hepatitis B surface antibody remains immune but it is slightly lower on the titer/level at 35 rather than 51.1 prior.  If drops to not detected, could give a booster dose of the hepatitis b vaccine for an amnestic response. Hep B quantitative DNA remains not detected. Glucose 99 BUN of 10 creatinine 1.22 and previously was 1.18 and prior to that 1.61.  It is currently in normal range at 1.22. Sodium 136 potassium 4.4 albumin 4.4 and calcium 9.9 total bilirubin 0.7 alkaline phosphatase 80 AST 17 and ALT 10 with ideal ALT less than 35 so doing well there. White blood cell count 6.8 hemoglobin 14.7 which is up from 13.1.  Platelet count normal at 236.  MCV normal at 84.  Neutrophils 3.7 and lymphocytes normal at 2.4. Dr. Harrington Dear Jerzy Hardy, August 5 labs show white blood cell count 8.8 RBC count slightly low at 4.09 with normal from 4.14 up to 5.8. Previously you were normal at 4.74 and 5.5 but clearly lower now. Please share with primary provider. Hemoglobin lower also at 13.1 from previous 14.5 and prior 16.8. Hemoglobin currently at the lower side of normal. Platelet count 318. Neutrophils normal at 4.4 with lymphocytes up a little bit at 3.5. Were you ill recently? Glucose 72 BUN down to 15 from 28. Creatinine down to 1.18 from 1.61. Sodium 140 potassium 4.8 calcium 9.8 albumin 3.9 bilirubin 0.4 alkaline phosphatase 85 AST 13 ALT 13. Previously AST 13 and ALT of 8. Hep B DNA not detected. So labs for liver better and creatinine and suspect prior diarrhea and colitis issues had affected him and dropped the hg. He says not bleeding now and bowels better. Pt says also had a cold and so maybe explains the lymphocytes being little up. Spoke with pt. Dr Harrington   June 23 2021 labs show hepatitis B immunity so you are protected. Hep B DNA not detected. Glucose 92 BUN of 28 creatinine is elevated at 1.61 and has varied from 1.23 up to 1.53 before. We need to look at your hydration status with these changes. The summer heat has been kicking up lately. Sodium 137 potassium 4.9 calcium 9.9 albumin 4.3 bilirubin 0.5 alkaline phosphatase 95 AST down to 13 ALT of 8 previously AST 29 ALT 17 so liver enzymes definitely lower. White blood cell count 7 hemoglobin 14.5 platelet count 315 MCV 94 neutrophils 3.4. Per notes on tenofovir df po qd and so still above 50. Egfr 55. Having diarrhea at the time and he says that it is better.  He did the 2nd entyvio now about 2 weeks ago and says starting to do better now.  He says the diarrhea not there now and some hemorrhodis.  Dr Silva saw recently with Namrata and they see him again sept. Did entyvio aug 23.  Aug 5 wbc 8.8 and hg 13.1 plat 318 and mcv 95 and neutrophils 4.4 and lymph 3.5 little up and glu 72 and cr 1.18 and na 140 and k 4.8 and cl 103 and c02 26 and alb 3.9 and tv 0.4 and alk 85 and ast and alt 13. HBV pending.  Recap: Patient was last seen back on Radha 3 by Namrata Pillai PA-C, for his noted hx of history of ulcerative colitis left-sided first noted in 2011. Previously followed by Dr. Rasmussen and few others . Also had seen Dr En Mattson as well prior.  Had been on Remicade 5 mg/kg every 8 weeks since 2011 and did not respond to oral treatment prior. No known complications or surgeries noted.  Patient on the Remicade had been much better but never was in clinical remission and still with persistent joint pains.  Patient in the fall 2020 was noted flaring and a [employer] panel showed undetected Remicade levels and presence of antibodies.  He was transitioned to Humira in November 2020 on a every 2-week basis and is being followed for this. He says when getting higher dose doing beter and then more issues when lower.  During induction he felt well but with maintenance started had some increase in symptoms. 1 pen misfired in January. Noted to have some hematochezia in January and then more increased bright red blood per rectum with bowel movements. 10 to 15 bms per day. Patient lost 7 pounds.  Patient started on prednisone in June at 10 mg a day with some mild improvement during the day but still with nocturnal stools.  He was changed to budesonide 9mg a day and on that until 3rd dose. Also to do a suppository and says had issues when had diarrhea and doing better now also.  BM post meals 4 a day and most solid now. occ runny. Prior more watery.  Colonoscopy 2019 with 1 cm transverse colon adenoma.  August 2020 colonoscopy mild inflammation up to 15 cm from anal verge biopsies showed chronic active proctitis. Remaining colon normal. He stopped herbals. Only onprobiotic and only on culturelle a day.  Prior pt listed as taking some herbals specifically a papaya enzyme extract, some glucosamine sulfate, aloe vera, omega-3 as well as a probiotic. Also noted to be on allopurinol and colchicine for gout.  Weight noted to be 28.55 BMI.  Dec 2020 Liver enzymes show bilirubin 0.8 alkaline phosphatase 65 AST 29 ALT 17. White cell count 5.7 hemoglobin 60.8 plate count 224. Glucose 92 BUN of 17 creatinine 1.23 sodium 140 potassium 4.3. Patient had hepatitis as well as quantiferon on Radha 3 and was indeterminant and to get cxr for tb. No symptoms from this. October 2020 labs showed QuantiFERON gold plus was negative. White cell count 5.7 hemoglobin 16.8 platelet count 224 back in December 2020 AST then 29 ALT 17 alk phos 65 total bilirubin 0.8 sodium 140 potassium 4.3 glucose 92 BUN of 17 creatinine 1.23. Vitamin D level 86.6. June 2021 labs show hep B surface antigen negative hep B surface antibody immune at 51.1.  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  He retired  and did shots and he does not remember having the hep b.  These meds on suppress immune system. Can reactivate the hep. 2011 to now not reactivated but never know and also havign some ab and not as immune suppressed and now on steroids and new med.  Typically need a medicine to prevent this as if does reactivate and it does not usually clear as most people stay on this.  Some meds follow and do labs 1-3 m and treat as needed if low risk but this class is high risk.  HBV prevention meds have risk and need to follow labs as they can affect kidneys and bones can be issues.  He is also on steroids and if this persists then bone risk and the first line med for hep b tenofovir df may have more risk than tenofovir af that less bone and renal risk and have to consider,  Bone density not done and need one and we can can ask Namrata re this and if abn then we need to look at starting that cliff if to stay on steroids.  Older records: October 2015 hep B DNA not detected. Hep B surface antigen negative. He did the [facility].s at [facility] in june 2021 and we did not see that result.

## 2024-01-04 ENCOUNTER — OFFICE VISIT (OUTPATIENT)
Dept: URBAN - METROPOLITAN AREA TELEHEALTH 2 | Facility: TELEHEALTH | Age: 59
End: 2024-01-04
Payer: MEDICARE

## 2024-01-04 VITALS — BODY MASS INDEX: 32.07 KG/M2 | WEIGHT: 224 LBS | HEIGHT: 70 IN

## 2024-01-04 DIAGNOSIS — K50.90 ULCERATIVE COLITIS: ICD-10-CM

## 2024-01-04 DIAGNOSIS — Z79.899 HIGH RISK MEDICATION USE: ICD-10-CM

## 2024-01-04 PROCEDURE — 99214 OFFICE O/P EST MOD 30 MIN: CPT | Performed by: PHYSICIAN ASSISTANT

## 2024-01-04 RX ORDER — ATORVASTATIN CALCIUM 20 MG/1
1 TABLET TABLET, FILM COATED ORAL ONCE A DAY
Status: ACTIVE | COMMUNITY

## 2024-01-04 RX ORDER — UPADACITINIB 30 MG/1
TAKE 1 TABLET BY MOUTH 1 TIME A DAY TABLET, EXTENDED RELEASE ORAL
Qty: 30 | Refills: 6 | Status: ACTIVE | COMMUNITY

## 2024-01-04 RX ORDER — TENOFOVIR ALAFENAMIDE 25 MG/1
TAKE 1 TABLET BY MOUTH 1 TIME A DAY WITH FOOD TABLET ORAL
Qty: 30 TABLET | Refills: 2 | Status: ACTIVE | COMMUNITY

## 2024-01-04 RX ORDER — COLCHICINE 0.6 MG/1
1 CAPSULE CAPSULE ORAL
Status: ACTIVE | COMMUNITY

## 2024-01-04 RX ORDER — OLMESARTAN MEDOXOMIL AND HYDROCHLOROTHIAZIDE 20/12.5 20; 12.5 MG/1; MG/1
1 TABLET TABLET ORAL ONCE A DAY
Status: ACTIVE | COMMUNITY

## 2024-01-04 RX ORDER — TENOFOVIR ALAFENAMIDE 25 MG/1
1 TABLET WITH FOOD TABLET ORAL ONCE A DAY
Qty: 30 TABLET | Refills: 2 | Status: ACTIVE | COMMUNITY

## 2024-01-04 RX ORDER — DULOXETINE HYDROCHLORIDE 60 MG/1
1 CAPSULE CAPSULE, DELAYED RELEASE ORAL ONCE A DAY
Status: ACTIVE | COMMUNITY

## 2024-01-04 NOTE — HPI-TODAY'S VISIT:
58yoM with hx of UC, left sided dx in 2011. Previously followed by Dr. Osorio. Had been on Remicade with initialy improvement but lost response with the presence of antibodies. He was transitioned to Humira in 11/2020 but despite this persistent sx and drug levels 1.7 + detectable AB. Failed to respond to Entyvio and was admitted to [facility] JAN 2022 AND colonoscopy showed continuous circumferential inflammation from rectum to transverse colon, whelan grade 2-3, path with moderate colitis rectum, L colon and transverse, neg for CMV. he was started on IV steroids, transitioned to po and in Sept 2022 started Rinvoq with immediate clinical remission and resolution of arthritis (on 30mg maintenance dose). BMs once/day, no blood, no urgency. No abd pain or joint pain. Had elevated cholesterol but saw PCP and on stain and reports normalization of cholesterol in last few months.  Colonoscopy 8/2020 with moderate inflammation upto 15cm from anal verge. Bx + chronic active proctitis  Repeat Colonoscopy 11/11/2022 showed patchy mild erythema in desc, transv, ascending, bx X6 without active UC.   Follows with Dr. Harrington/Liver clinic for Hep B core ab, was on viread but stopped and on vemlidy. US 8/2023 + sludge/stones but no acute amari + hepatic steatosis Got shingles vaccine, covid and flu

## 2024-01-15 ENCOUNTER — LAB OUTSIDE AN ENCOUNTER (OUTPATIENT)
Dept: URBAN - METROPOLITAN AREA TELEHEALTH 2 | Facility: TELEHEALTH | Age: 59
End: 2024-01-15

## 2024-01-18 ENCOUNTER — OFFICE VISIT (OUTPATIENT)
Dept: URBAN - METROPOLITAN AREA CLINIC 91 | Facility: CLINIC | Age: 59
End: 2024-01-18
Payer: MEDICARE

## 2024-01-18 ENCOUNTER — OFFICE VISIT (OUTPATIENT)
Dept: URBAN - METROPOLITAN AREA CLINIC 86 | Facility: CLINIC | Age: 59
End: 2024-01-18
Payer: MEDICARE

## 2024-01-18 VITALS
WEIGHT: 225 LBS | SYSTOLIC BLOOD PRESSURE: 151 MMHG | HEIGHT: 70 IN | DIASTOLIC BLOOD PRESSURE: 92 MMHG | TEMPERATURE: 98.3 F | BODY MASS INDEX: 32.21 KG/M2 | HEART RATE: 94 BPM

## 2024-01-18 DIAGNOSIS — K76.0 FATTY LIVER: ICD-10-CM

## 2024-01-18 DIAGNOSIS — R19.7 DIARRHEA: ICD-10-CM

## 2024-01-18 DIAGNOSIS — R89.4 HEPATITIS B CORE ANTIBODY POSITIVE: ICD-10-CM

## 2024-01-18 DIAGNOSIS — K80.20 CHOLELITHIASIS: ICD-10-CM

## 2024-01-18 DIAGNOSIS — R79.89 ELEVATED SERUM CREATININE: ICD-10-CM

## 2024-01-18 DIAGNOSIS — R76.11 ABNORMAL REACTION TO TUBERCULIN TEST: ICD-10-CM

## 2024-01-18 DIAGNOSIS — K51.90 ULCERATIVE COLITIS: ICD-10-CM

## 2024-01-18 DIAGNOSIS — R63.4 WEIGHT LOSS: ICD-10-CM

## 2024-01-18 DIAGNOSIS — Z71.89 VACCINE COUNSELING: ICD-10-CM

## 2024-01-18 DIAGNOSIS — R93.2 ABNORMAL ULTRASOUND OF LIVER: ICD-10-CM

## 2024-01-18 DIAGNOSIS — Z79.899 HIGH RISK MEDICATION USE: ICD-10-CM

## 2024-01-18 DIAGNOSIS — E66.9 OBESITY (BMI 30-39.9): ICD-10-CM

## 2024-01-18 PROCEDURE — 99214 OFFICE O/P EST MOD 30 MIN: CPT | Performed by: PHYSICIAN ASSISTANT

## 2024-01-18 PROCEDURE — 76705 ECHO EXAM OF ABDOMEN: CPT

## 2024-01-18 PROCEDURE — 93975 VASCULAR STUDY: CPT

## 2024-01-18 RX ORDER — COLCHICINE 0.6 MG/1
1 CAPSULE CAPSULE ORAL
Status: ACTIVE | COMMUNITY

## 2024-01-18 RX ORDER — TENOFOVIR ALAFENAMIDE 25 MG/1
1 TABLET WITH FOOD TABLET ORAL ONCE A DAY
Qty: 30 TABLET | Refills: 2 | Status: ACTIVE | COMMUNITY

## 2024-01-18 RX ORDER — DULOXETINE HYDROCHLORIDE 60 MG/1
1 CAPSULE CAPSULE, DELAYED RELEASE ORAL ONCE A DAY
Status: ACTIVE | COMMUNITY

## 2024-01-18 RX ORDER — OLMESARTAN MEDOXOMIL AND HYDROCHLOROTHIAZIDE 20/12.5 20; 12.5 MG/1; MG/1
1 TABLET TABLET ORAL ONCE A DAY
Status: ACTIVE | COMMUNITY

## 2024-01-18 RX ORDER — TENOFOVIR ALAFENAMIDE 25 MG/1
1 TABLET WITH FOOD TABLET ORAL ONCE A DAY
Qty: 30 TABLET | Refills: 2 | OUTPATIENT

## 2024-01-18 RX ORDER — ATORVASTATIN CALCIUM 20 MG/1
1 TABLET TABLET, FILM COATED ORAL ONCE A DAY
Status: ACTIVE | COMMUNITY

## 2024-01-18 RX ORDER — TENOFOVIR ALAFENAMIDE 25 MG/1
TAKE 1 TABLET BY MOUTH 1 TIME A DAY WITH FOOD TABLET ORAL
Qty: 30 TABLET | Refills: 2 | Status: ACTIVE | COMMUNITY

## 2024-01-18 RX ORDER — UPADACITINIB 30 MG/1
TAKE 1 TABLET BY MOUTH 1 TIME A DAY TABLET, EXTENDED RELEASE ORAL
Qty: 30 | Refills: 6 | Status: ACTIVE | COMMUNITY

## 2024-01-18 NOTE — HPI-TODAY'S VISIT:
Patient is a 56-year-old male referred by Dr. Yonatan Silva Aug  2021 for evaluation of liver issue if the hep b core positive and on tx now for HBV prophylaxis for same due to isp.  A copy of the note will be sent to Dr. Yonatan Silva.  1/18/24 office visit he had the US this morning but they were still verifying the insurance issues he is seeing pcp with PSA issues  Needs to see what the labs look like.     recap 10/26/23 telemed 10/19/23 labs The complete blood count overall is normal other than the eosinophils are slightly low and I would recommend sharing that with your primary care. The creatinine or kidney function elevated at 1.56 and I would also recommend sharing this with the primary care. Previously back in June of this year was 1.44. The bilirubin 0.5, alkaline phosphatase 80, AST 55, ALT 54. Back in June the AST was 44 and the ALT was 36. What changed? The hepatitis B DNA is listed as being less than 10 but being detected still having missed any doses of the medication? It could be that these labs are very sensitive. This still does not necessarily explain why the labs are high. Has the diet been bad? Any recent illness? Please let me know.    he missed 3-4 doses. med delayed  he had covid at the end of sept/beginning of oct and then had paxlovid so could explain the bump   RECAP 7/25/23 ov   The July 19, 2023 ultrasound was sent to me.  The liver with diffuse increased echogenicity suggesting fatty liver.  No lesions.  The common bile duct was 3 mm and they thought they saw gallstones within the gallbladder.  The right kidney is 9.3 cm and appears normal.  Spleen 9.7 cm and appears normal.  The hepatic vasculature is patent.  Overall they saw fatty liver and as discussed the treatment for this is diet, exercise, and weight loss.  We will review this at the follow-up.   The creatinine 1.44, elevated and please share with the primary care. The sodium 136, potassium 4.9, bilirubin 0.5, ast 44, previously 42, alt 36 previously 41. Goal for the ast/alt is less than 35. HBV dna is listed as detected but not quantifiable. Curious if this is a false positive test as previously in May 2023 this was not detected and you have immunity to the virus which is protected and the surface antigen is negative therefore should not have any virus. We will need to check this again. I'll include a lab slip with this and you can check it again locally. The complete blood count is normal other than the eosinophils are low and recommend sharing this with the primary care. Please be sure to do the lab include with this letter and I will follow up with that result.  7/19/23 hbv not detected and hepatic panel done and ast 34 alt 31. clearly the previous lab was error. the ast and alt improved and asked what changed he has been working on diet and avoiding fried foods HE has been taking the vemlidy , gets from [facility]   He is on the RINvoq now for colitis issues Hepatotoxicity In the prelicensure clinical trials of upadacitinib in patients with rheumatoid arthritis, liver test abnormalities were frequent although usually mild. Some degree of ALT elevation arose in up to 11% of upadacitinib treated patients compared to 7% treated with placebo, but were above 3 times the upper limit of normal (ULN) in 2% or less. Furthermore, similar rates of ALT elevations arose in patients treated with methotrexate or biologic DMARDs. In these trials that enrolled over 3000 patients, there were no reports of clinically apparent liver injury, serious cases of liver injury or liver-related deaths. Similarly, other IRIS inhibitors such as tofacitinib and baricitinib have been associated with frequent minor serum aminotransferase elevations during treatment, but episodes of clinically apparent liver injury have not been reported. Thus, this class of agents is suspected but not proven capable of causing liver injury.  In addition, long term treatment with upadacitinib and other Janus kinase inhibitors has been linked to rare instances of reactivation of hepatitis B that can be severe and has been linked to fatal outcomes. Reactivation can become clinically apparent after the JAK inhibitor is discontinued, when immune restoration results in an immunologic response to the heightened viral replication.  Likelihood score: D (possible, rare cause of clinically apparent liver injury including reactivation of hepatitis B in susceptible patients). Discussed the hep b and not on tx and need  discussed chol lab and can be due to the renvoq  Discussed the choletserol and while rinvoq can impact this also diet and disussed the rf for fatty lvier He was put on a cholesterol medication recently atorvastatin and we will follow this His cr is 1.62 and check today and will do vemlidy given this. discussed goal for ast and alt is less than 35. suspect up due to diet, hcol and will montior  recap January 13 ultrasound shows pancreas not well seen due to gas and body habitus.  Limited exam was unremarkable. Liver was mildly echogenic and with no discrete liver lesion. Liver vessels were patent as expected. They do see some echogenic foci in the gallbladder lumen with possible posterior acoustic shadowing which would make them consider that you may have gallstones and/or some sludge balls there.  That appears to be new versus your prior exam of June of last year. Common bile duct was 2 mm and that was within normal limits.  Right kidney was 9.6 cm with no hydronephrosis.  Spleen was normal at 11.3 cm. They suspect liver has some mild fatty infiltration and that you had these new gallstones versus sludge balls seen in the gallbladder lumen. In the prior study they mention a 1.2 cm echogenic lesion in the liver but they did not see this at this time. They recommended a 3 to 6-month follow-up right upper quadrant ultrasound and I would favor it being done at 3 months. I also noticed that you had a Dec 14 appt but no showed and you have not been rebooked to see us. Please call Sylvia at [phone] ext [phone] to be booked for an appt to see how you are doing and to review the plan and order this follow up. Thank you. Dr Harrington.     Nov 2021 labs: Hepatitis B surface antibody remains immune but it is slightly lower on the titer/level at 35 rather than 51.1 prior.  If drops to not detected, could give a booster dose of the hepatitis b vaccine for an amnestic response. Hep B quantitative DNA remains not detected. Glucose 99 BUN of 10 creatinine 1.22 and previously was 1.18 and prior to that 1.61.  It is currently in normal range at 1.22. Sodium 136 potassium 4.4 albumin 4.4 and calcium 9.9 total bilirubin 0.7 alkaline phosphatase 80 AST 17 and ALT 10 with ideal ALT less than 35 so doing well there. White blood cell count 6.8 hemoglobin 14.7 which is up from 13.1.  Platelet count normal at 236.  MCV normal at 84.  Neutrophils 3.7 and lymphocytes normal at 2.4. Dr. Harrington Dear Jerzy Hardy, August 5 labs show white blood cell count 8.8 RBC count slightly low at 4.09 with normal from 4.14 up to 5.8. Previously you were normal at 4.74 and 5.5 but clearly lower now. Please share with primary provider. Hemoglobin lower also at 13.1 from previous 14.5 and prior 16.8. Hemoglobin currently at the lower side of normal. Platelet count 318. Neutrophils normal at 4.4 with lymphocytes up a little bit at 3.5. Were you ill recently? Glucose 72 BUN down to 15 from 28. Creatinine down to 1.18 from 1.61. Sodium 140 potassium 4.8 calcium 9.8 albumin 3.9 bilirubin 0.4 alkaline phosphatase 85 AST 13 ALT 13. Previously AST 13 and ALT of 8. Hep B DNA not detected. So labs for liver better and creatinine and suspect prior diarrhea and colitis issues had affected him and dropped the hg. He says not bleeding now and bowels better. Pt says also had a cold and so maybe explains the lymphocytes being little up. Spoke with pt. Dr Harrington   June 23 2021 labs show hepatitis B immunity so you are protected. Hep B DNA not detected. Glucose 92 BUN of 28 creatinine is elevated at 1.61 and has varied from 1.23 up to 1.53 before. We need to look at your hydration status with these changes. The summer heat has been kicking up lately. Sodium 137 potassium 4.9 calcium 9.9 albumin 4.3 bilirubin 0.5 alkaline phosphatase 95 AST down to 13 ALT of 8 previously AST 29 ALT 17 so liver enzymes definitely lower. White blood cell count 7 hemoglobin 14.5 platelet count 315 MCV 94 neutrophils 3.4. Per notes on tenofovir df po qd and so still above 50. Egfr 55. Having diarrhea at the time and he says that it is better.  He did the 2nd entyvio now about 2 weeks ago and says starting to do better now.  He says the diarrhea not there now and some hemorrhodis.  Dr Silva saw recently with Namrata and they see him again sept. Did entyvio aug 23.  Aug 5 wbc 8.8 and hg 13.1 plat 318 and mcv 95 and neutrophils 4.4 and lymph 3.5 little up and glu 72 and cr 1.18 and na 140 and k 4.8 and cl 103 and c02 26 and alb 3.9 and tv 0.4 and alk 85 and ast and alt 13. HBV pending.  Recap: Patient was last seen back on Radha 3 by Namrata Pillai PA-C, for his noted hx of history of ulcerative colitis left-sided first noted in 2011. Previously followed by Dr. Rasmussen and few others . Also had seen Dr En Mattson as well prior.  Had been on Remicade 5 mg/kg every 8 weeks since 2011 and did not respond to oral treatment prior. No known complications or surgeries noted.  Patient on the Remicade had been much better but never was in clinical remission and still with persistent joint pains.  Patient in the fall 2020 was noted flaring and a [employer] panel showed undetected Remicade levels and presence of antibodies.  He was transitioned to Humira in November 2020 on a every 2-week basis and is being followed for this. He says when getting higher dose doing beter and then more issues when lower.  During induction he felt well but with maintenance started had some increase in symptoms. 1 pen misfired in January. Noted to have some hematochezia in January and then more increased bright red blood per rectum with bowel movements. 10 to 15 bms per day. Patient lost 7 pounds.  Patient started on prednisone in June at 10 mg a day with some mild improvement during the day but still with nocturnal stools.  He was changed to budesonide 9mg a day and on that until 3rd dose. Also to do a suppository and says had issues when had diarrhea and doing better now also.  BM post meals 4 a day and most solid now. occ runny. Prior more watery.  Colonoscopy 2019 with 1 cm transverse colon adenoma.  August 2020 colonoscopy mild inflammation up to 15 cm from anal verge biopsies showed chronic active proctitis. Remaining colon normal. He stopped herbals. Only onprobiotic and only on culturelle a day.  Prior pt listed as taking some herbals specifically a papaya enzyme extract, some glucosamine sulfate, aloe vera, omega-3 as well as a probiotic. Also noted to be on allopurinol and colchicine for gout.  Weight noted to be 28.55 BMI.  Dec 2020 Liver enzymes show bilirubin 0.8 alkaline phosphatase 65 AST 29 ALT 17. White cell count 5.7 hemoglobin 60.8 plate count 224. Glucose 92 BUN of 17 creatinine 1.23 sodium 140 potassium 4.3. Patient had hepatitis as well as quantiferon on Radha 3 and was indeterminant and to get cxr for tb. No symptoms from this. October 2020 labs showed QuantiFERON gold plus was negative. White cell count 5.7 hemoglobin 16.8 platelet count 224 back in December 2020 AST then 29 ALT 17 alk phos 65 total bilirubin 0.8 sodium 140 potassium 4.3 glucose 92 BUN of 17 creatinine 1.23. Vitamin D level 86.6. June 2021 labs show hep B surface antigen negative hep B surface antibody immune at 51.1.  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  He retired  and did shots and he does not remember having the hep b.  These meds on suppress immune system. Can reactivate the hep. 2011 to now not reactivated but never know and also havign some ab and not as immune suppressed and now on steroids and new med.  Typically need a medicine to prevent this as if does reactivate and it does not usually clear as most people stay on this.  Some meds follow and do labs 1-3 m and treat as needed if low risk but this class is high risk.  HBV prevention meds have risk and need to follow labs as they can affect kidneys and bones can be issues.  He is also on steroids and if this persists then bone risk and the first line med for hep b tenofovir df may have more risk than tenofovir af that less bone and renal risk and have to consider,  Bone density not done and need one and we can can ask Namrata re this and if abn then we need to look at starting that cliff if to stay on steroids.  Older records: October 2015 hep B DNA not detected. Hep B surface antigen negative. He did the u.s at [facility] in june 2021 and we did not see that result.

## 2024-01-19 ENCOUNTER — ERX REFILL RESPONSE (OUTPATIENT)
Dept: URBAN - METROPOLITAN AREA CLINIC 92 | Facility: CLINIC | Age: 59
End: 2024-01-19

## 2024-01-19 RX ORDER — TENOFOVIR ALAFENAMIDE 25 MG/1
1 TABLET WITH FOOD TABLET ORAL ONCE A DAY
Qty: 30 TABLET | Refills: 2 | OUTPATIENT

## 2024-01-19 RX ORDER — UPADACITINIB 30 MG/1
TAKE 1 TABLET BY MOUTH 1 TIME A DAY TABLET, EXTENDED RELEASE ORAL
Qty: 30 | Refills: 6 | OUTPATIENT

## 2024-01-19 RX ORDER — TENOFOVIR ALAFENAMIDE 25 MG/1
TAKE 1 TABLET BY MOUTH ONCE DAILY WITH FOOD TABLET ORAL
Qty: 30 TABLET | Refills: 2 | OUTPATIENT

## 2024-01-20 LAB
A/G RATIO: 1.5
ABSOLUTE BASOPHILS: 12
ABSOLUTE EOSINOPHILS: 37
ABSOLUTE LYMPHOCYTES: 1866
ABSOLUTE MONOCYTES: 490
ABSOLUTE NEUTROPHILS: 3794
ALBUMIN: 4.6
ALKALINE PHOSPHATASE: 61
ALT (SGPT): 52
AST (SGOT): 40
BASOPHILS: 0.2
BILIRUBIN, TOTAL: 0.7
BUN/CREATININE RATIO: 16
BUN: 23
CALCIUM: 9.5
CARBON DIOXIDE, TOTAL: 25
CHLORIDE: 103
CREATININE: 1.43
EGFR: 58
EOSINOPHILS: 0.6
GLOBULIN, TOTAL: 3.1
GLUCOSE: 104
HBV LOG10: NOT DETECTED
HEMATOCRIT: 45.1
HEMOGLOBIN: 15.1
HEPATITIS B VIRUS DNA: NOT DETECTED
LYMPHOCYTES: 30.1
MCH: 31.9
MCHC: 33.5
MCV: 95.3
MONOCYTES: 7.9
MPV: 9.8
NEUTROPHILS: 61.2
PLATELET COUNT: 375
POTASSIUM: 4.1
PROTEIN, TOTAL: 7.7
RDW: 13.3
RED BLOOD CELL COUNT: 4.73
SODIUM: 140
WHITE BLOOD CELL COUNT: 6.2

## 2024-01-22 ENCOUNTER — TELEPHONE ENCOUNTER (OUTPATIENT)
Dept: URBAN - METROPOLITAN AREA CLINIC 86 | Facility: CLINIC | Age: 59
End: 2024-01-22

## 2024-01-22 ENCOUNTER — LAB OUTSIDE AN ENCOUNTER (OUTPATIENT)
Dept: URBAN - METROPOLITAN AREA TELEHEALTH 2 | Facility: TELEHEALTH | Age: 59
End: 2024-01-22

## 2024-01-22 NOTE — HPI-TODAY'S VISIT:
Dear Jerzy Hardy,  The recent January 18 labs were sent to me.  The Kos was 104 which is elevated if you are fasting.  Please share with the primary care.  The creatinine 1.43 and this is also elevated.  Previously 1.56 is slightly lower.  The bilirubin 0.7, alkaline phosphatase 61, AST 40, ALT 52.  Previously the AST was 55 and ALT was 84 back in October so much lower now.  Goal is less than 35 so you still need to work on things the hepatitis B is not detected.  The complete blood count including the white blood cells, red blood cells, hemoglobin and platelets all normal. Iva Reeder PA-C

## 2024-01-22 NOTE — HPI-TODAY'S VISIT:
Dear Jerzy Hardy,  The recent 1/18/24 ultrasound was sent to me.  A liver with increased echogenicity suggesting fatty liver.  Still need to work on this.  They saw a gallbladder stone that is approximately 7 mm.  Common bile duct is normal in size at 3.9 mm.  The right kidney appeared normal.  Spleen 10.0 centimeters and this is normal.  The hepatic vasculature is patent.  Overall they saw fatty liver and a gallstone and really need to work on the diet, exercise, and weight loss to help with the fatty liver as discussed at the visit. Iva Reeder PA-C

## 2024-03-07 ENCOUNTER — OV EP (OUTPATIENT)
Dept: URBAN - METROPOLITAN AREA CLINIC 92 | Facility: CLINIC | Age: 59
End: 2024-03-07
Payer: MEDICARE

## 2024-03-07 VITALS
BODY MASS INDEX: 32.21 KG/M2 | TEMPERATURE: 96.4 F | DIASTOLIC BLOOD PRESSURE: 89 MMHG | HEIGHT: 70 IN | SYSTOLIC BLOOD PRESSURE: 139 MMHG | HEART RATE: 87 BPM | WEIGHT: 225 LBS

## 2024-03-07 DIAGNOSIS — K51.919 COLITIS, ULCERATIVE: ICD-10-CM

## 2024-03-07 PROCEDURE — 99214 OFFICE O/P EST MOD 30 MIN: CPT | Performed by: PHYSICIAN ASSISTANT

## 2024-03-07 RX ORDER — OLMESARTAN MEDOXOMIL AND HYDROCHLOROTHIAZIDE 20/12.5 20; 12.5 MG/1; MG/1
1 TABLET TABLET ORAL ONCE A DAY
Status: ACTIVE | COMMUNITY

## 2024-03-07 RX ORDER — UPADACITINIB 30 MG/1
TAKE 1 TABLET BY MOUTH 1 TIME A DAY TABLET, EXTENDED RELEASE ORAL
Qty: 30 | Refills: 6 | Status: ACTIVE | COMMUNITY

## 2024-03-07 RX ORDER — TENOFOVIR ALAFENAMIDE 25 MG/1
TAKE 1 TABLET BY MOUTH ONCE DAILY WITH FOOD TABLET ORAL
Qty: 30 TABLET | Refills: 2 | Status: ACTIVE | COMMUNITY

## 2024-03-07 RX ORDER — ATORVASTATIN CALCIUM 20 MG/1
1 TABLET TABLET, FILM COATED ORAL ONCE A DAY
Status: ACTIVE | COMMUNITY

## 2024-03-07 RX ORDER — DULOXETINE HYDROCHLORIDE 60 MG/1
1 CAPSULE CAPSULE, DELAYED RELEASE ORAL ONCE A DAY
Status: ACTIVE | COMMUNITY

## 2024-03-07 RX ORDER — COLCHICINE 0.6 MG/1
1 CAPSULE CAPSULE ORAL
Status: ACTIVE | COMMUNITY

## 2024-03-07 NOTE — HPI-TODAY'S VISIT:
58yoM with hx of UC, left sided dx in 2011. Previously followed by Dr. Osroio. Had been on Remicade with initialy improvement but lost response with the presence of antibodies. He was transitioned to Humira in 11/2020 but despite this persistent sx and drug levels 1.7 + detectable AB. Failed to respond to Entyvio and was admitted to [facility] JAN 2022 AND colonoscopy showed continuous circumferential inflammation from rectum to transverse colon, whelan grade 2-3, path with moderate colitis rectum, L colon and transverse, neg for CMV. he was started on IV steroids, transitioned to po and in Sept 2022 started Rinvoq with immediate clinical remission and resolution of arthritis (on 30mg maintenance dose). BMs once/day, no blood, no urgency. No abd pain or joint pain. He notes in Jan mild constipation suddenly and then got the stomach bug 2 weeks ago (family had it also) and now back to BMs once/day, formed.   Had elevated cholesterol but saw PCP and on stain and reports normalization of cholesterol in last few months.  Colonoscopy 8/2020 with moderate inflammation upto 15cm from anal verge. Bx + chronic active proctitis   Repeat Colonoscopy 11/11/2022 showed patchy mild erythema in desc, transv, ascending, bx X6 without active UC.   Follows with Dr. Harrington/Liver clinic for Hep B core ab, was on viread but stopped and on vemlidy. US 8/2023 + sludge/stones but no acute amari + hepatic steatosis  Got shingles vaccine, covid and flu

## 2024-03-30 LAB
A/G RATIO: 1.6
ABSOLUTE BASOPHILS: 12
ABSOLUTE EOSINOPHILS: 43
ABSOLUTE LYMPHOCYTES: 1726
ABSOLUTE MONOCYTES: 384
ABSOLUTE NEUTROPHILS: 3935
ALBUMIN: 4.4
ALKALINE PHOSPHATASE: 69
ALT (SGPT): 64
AST (SGOT): 47
BASOPHILS: 0.2
BILIRUBIN, TOTAL: 0.7
BUN/CREATININE RATIO: 10
BUN: 13
CALCIUM: 9.3
CARBON DIOXIDE, TOTAL: 29
CHLORIDE: 102
CREATININE: 1.32
EGFR: 63
EOSINOPHILS: 0.7
GLOBULIN, TOTAL: 2.8
GLUCOSE: 106
HBV LOG10: NOT DETECTED
HEMATOCRIT: 43.3
HEMOGLOBIN: 14.5
HEPATITIS B VIRUS DNA: NOT DETECTED
LYMPHOCYTES: 28.3
MCH: 31.9
MCHC: 33.5
MCV: 95.2
MONOCYTES: 6.3
MPV: 9.5
NEUTROPHILS: 64.5
PLATELET COUNT: 299
POTASSIUM: 4.5
PROTEIN, TOTAL: 7.2
RDW: 13.6
RED BLOOD CELL COUNT: 4.55
SODIUM: 139
WHITE BLOOD CELL COUNT: 6.1

## 2024-04-01 ENCOUNTER — LAB (OUTPATIENT)
Dept: URBAN - METROPOLITAN AREA CLINIC 86 | Facility: CLINIC | Age: 59
End: 2024-04-01

## 2024-05-01 ENCOUNTER — TELEPHONE ENCOUNTER (OUTPATIENT)
Dept: URBAN - METROPOLITAN AREA CLINIC 92 | Facility: CLINIC | Age: 59
End: 2024-05-01

## 2024-05-01 RX ORDER — TENOFOVIR ALAFENAMIDE 25 MG/1
TAKE 1 TABLET BY MOUTH ONCE DAILY WITH FOOD TABLET ORAL
Qty: 30 TABLET | Refills: 1

## 2024-05-06 ENCOUNTER — LAB OUTSIDE AN ENCOUNTER (OUTPATIENT)
Dept: URBAN - METROPOLITAN AREA CLINIC 92 | Facility: CLINIC | Age: 59
End: 2024-05-06

## 2024-05-20 NOTE — PHYSICAL EXAM SKIN:
Patient reports few day history of bladder pressure and pressure with urination. Reports area is tender. She has been having fevers on and off but was also diagnosed with pneumonia. Advised patient given urine sample to check for UTI, orders placed per protocol. She will give sample today.   no rashes , no suspicious lesions , no areas of discoloration , no jaundice present , good turgor , no masses , no tenderness on palpation

## 2024-05-23 ENCOUNTER — TELEPHONE ENCOUNTER (OUTPATIENT)
Dept: URBAN - METROPOLITAN AREA CLINIC 92 | Facility: CLINIC | Age: 59
End: 2024-05-23

## 2024-05-30 ENCOUNTER — OFFICE VISIT (OUTPATIENT)
Dept: URBAN - METROPOLITAN AREA CLINIC 92 | Facility: CLINIC | Age: 59
End: 2024-05-30

## 2024-06-04 ENCOUNTER — OFFICE VISIT (OUTPATIENT)
Dept: URBAN - METROPOLITAN AREA TELEHEALTH 2 | Facility: TELEHEALTH | Age: 59
End: 2024-06-04
Payer: COMMERCIAL

## 2024-06-04 ENCOUNTER — LAB OUTSIDE AN ENCOUNTER (OUTPATIENT)
Dept: URBAN - METROPOLITAN AREA TELEHEALTH 2 | Facility: TELEHEALTH | Age: 59
End: 2024-06-04

## 2024-06-04 ENCOUNTER — DASHBOARD ENCOUNTERS (OUTPATIENT)
Age: 59
End: 2024-06-04

## 2024-06-04 VITALS — HEIGHT: 70 IN | BODY MASS INDEX: 30.78 KG/M2 | WEIGHT: 215 LBS

## 2024-06-04 DIAGNOSIS — K76.0 FATTY LIVER: ICD-10-CM

## 2024-06-04 DIAGNOSIS — K51.80 CHRONIC PANCOLONIC ULCERATIVE COLITIS: ICD-10-CM

## 2024-06-04 DIAGNOSIS — R19.7 DIARRHEA: ICD-10-CM

## 2024-06-04 DIAGNOSIS — R89.4 HEPATITIS B CORE ANTIBODY POSITIVE: ICD-10-CM

## 2024-06-04 PROCEDURE — 99215 OFFICE O/P EST HI 40 MIN: CPT

## 2024-06-04 RX ORDER — TENOFOVIR ALAFENAMIDE 25 MG/1
TAKE 1 TABLET BY MOUTH ONCE DAILY WITH FOOD TABLET ORAL
Qty: 30 TABLET | Refills: 1 | Status: ACTIVE | COMMUNITY

## 2024-06-04 RX ORDER — ATORVASTATIN CALCIUM 20 MG/1
1 TABLET TABLET, FILM COATED ORAL ONCE A DAY
Status: ACTIVE | COMMUNITY

## 2024-06-04 RX ORDER — DULOXETINE HYDROCHLORIDE 60 MG/1
1 CAPSULE CAPSULE, DELAYED RELEASE ORAL ONCE A DAY
Status: ACTIVE | COMMUNITY

## 2024-06-04 RX ORDER — TENOFOVIR ALAFENAMIDE 25 MG/1
1 TABLET WITH FOOD TABLET ORAL ONCE A DAY
Qty: 30 TABLET | Refills: 2 | OUTPATIENT

## 2024-06-04 RX ORDER — OLMESARTAN MEDOXOMIL AND HYDROCHLOROTHIAZIDE 20; 12.5 MG/1; MG/1
1 TABLET TABLET ORAL ONCE A DAY
Status: ACTIVE | COMMUNITY

## 2024-06-04 RX ORDER — COLCHICINE 0.6 MG/1
1 CAPSULE CAPSULE ORAL
Status: ACTIVE | COMMUNITY

## 2024-06-04 RX ORDER — UPADACITINIB 30 MG/1
TAKE 1 TABLET BY MOUTH 1 TIME A DAY TABLET, EXTENDED RELEASE ORAL
Qty: 30 | Refills: 6 | Status: ACTIVE | COMMUNITY

## 2024-06-04 NOTE — HPI-TODAY'S VISIT:
Patient is a 59-year-old male referred by Dr. Yonatan Silva and last seen Jan 2024 by kadi for evaluation of liver issue hep b core positive and on tx now for HBV prophylaxis for same due to ongoing isp.  A copy of the note will be sent to Dr. Yonatan Silva.  Pt is on preventative meds for the hep b core.  He has not done any labs and had appt and this was delayed.  3/27/24 labs were sent to me. The white blood cell 6.1, hemoglobin 14.5, MCV 95, platelets 299 this is normal. Blood sugar was 106 which is elevated if you are fasting. Creatinine up at 1.32, please share this with your primary care. Previously 1.43 to slightly lower this time. Bilirubin 0.7, alkaline phosphatase 69, AST 47 and ALT 64. These are still elevated. Goal is less than 35 for these. Hepatitis B is not detected. Really need to work on the fatty liver and the liver enzymes should improve. We will review this at your follow-up visit.  1/18/24 ultrasound was sent to kadi. A liver with increased echogenicity suggesting fatty liver. Still need to work on this. They saw a gallbladder stone that is approximately 7 mm. Common bile duct is normal in size at 3.9 mm. The right kidney appeared normal. Spleen 10.0 centimeters and this is normal. The hepatic vasculature is patent. Overall they saw fatty liver and a gallstone and really need to work on the diet, exercise, and weight loss to help with the fatty liver as discussed at the visit.  January 18 labs were sent to me. The gluc was 104 which is elevated if you are fasting. Please share with the primary care. The creatinine 1.43 and this is also elevated. Previously 1.56 is slightly lower. The bilirubin 0.7, alkaline phosphatase 61, AST 40, ALT 52. Previously the AST was 55 and ALT was 84 back in October so much lower now. Goal is less than 35 so you still need to work on things the hepatitis B is not detected. The complete blood count including the white blood cells, red blood cells, hemoglobin and platelets all normal.  Asked re weight and has lost some weight and follow.  Took trip.  1/18/24 office visit he had the US this morning but they were still verifying the insurance issues he is seeing pcp with PSA issues  Needs to see what the labs look like.    10/26/23 telemed 10/19/23 labs The complete blood count overall is normal other than the eosinophils are slightly low and I would recommend sharing that with your primary care. The creatinine or kidney function elevated at 1.56 and I would also recommend sharing this with the primary care. Previously back in June of this year was 1.44. The bilirubin 0.5, alkaline phosphatase 80, AST 55, ALT 54. Back in June the AST was 44 and the ALT was 36. What changed? The hepatitis B DNA is listed as being less than 10 but being detected still having missed any doses of the medication? It could be that these labs are very sensitive. This still does not necessarily explain why the labs are high. Has the diet been bad? Any recent illness? Please let me know.    he missed 3-4 doses. med delayed  he had covid at the end of sept/beginning of oct and then had paxlovid so could explain the bump   7/25/23 ov   The July 19, 2023 ultrasound was sent to me. The liver with diffuse increased echogenicity suggesting fatty liver. No lesions. The common bile duct was 3 mm and they thought they saw gallstones within the gallbladder. The right kidney is 9.3 cm and appears normal. Spleen 9.7 cm and appears normal. The hepatic vasculature is patent. Overall they saw fatty liver and as discussed the treatment for this is diet, exercise, and weight loss. We will review this at the follow-up.   The creatinine 1.44, elevated and please share with the primary care. The sodium 136, potassium 4.9, bilirubin 0.5, ast 44, previously 42, alt 36 previously 41. Goal for the ast/alt is less than 35. HBV dna is listed as detected but not quantifiable. Curious if this is a false positive test as previously in May 2023 this was not detected and you have immunity to the virus which is protected and the surface antigen is negative therefore should not have any virus. We will need to check this again. I'll include a lab slip with this and you can check it again locally. The complete blood count is normal other than the eosinophils are low and recommend sharing this with the primary care. Please be sure to do the lab include with this letter and I will follow up with that result.  7/19/23 hbv not detected and hepatic panel done and ast 34 alt 31. clearly the previous lab was error. the ast and alt improved and asked what changed he has been working on diet and avoiding fried foods HE has been taking the vemlidy , [facility]  He is on the RINvoq now for colitis issues and remains on this.  Hepatotoxicity In the prelicensure clinical trials of upadacitinib in patients with rheumatoid arthritis, liver test abnormalities were frequent although usually mild. Some degree of ALT elevation arose in up to 11% of upadacitinib treated patients compared to 7% treated with placebo, but were above 3 times the upper limit of normal (ULN) in 2% or less. Furthermore, similar rates of ALT elevations arose in patients treated with methotrexate or biologic DMARDs. In these trials that enrolled over 3000 patients, there were no reports of clinically apparent liver injury, serious cases of liver injury or liver-related deaths. Similarly, other IRIS inhibitors such as tofacitinib and baricitinib have been associated with frequent minor serum aminotransferase elevations during treatment, but episodes of clinically apparent liver injury have not been reported. Thus, this class of agents is suspected but not proven capable of causing liver injury.  In addition, long term treatment with upadacitinib and other Janus kinase inhibitors has been linked to rare instances of reactivation of hepatitis B that can be severe and has been linked to fatal outcomes. Reactivation can become clinically apparent after the JAK inhibitor is discontinued, when immune restoration results in an immunologic response to the heightened viral replication.  Likelihood score: D (possible, rare cause of clinically apparent liver injury including reactivation of hepatitis B in susceptible patients). Discussed the hep b and not on tx and need discussed chol lab and can be due to the renvoq Discussed the choletserol and while rinvoq can impact this also diet and disussed the rf for fatty lvier He was put on a cholesterol medication recently atorvastatin and we will follow this His cr is 1.62 and check today and will do vemlidy given this. discussed goal for ast and alt is less than 35. suspect up due to diet, hcol and will montior  recap January 13 ultrasound shows pancreas not well seen due to gas and body habitus. Limited exam was unremarkable. Liver was mildly echogenic and with no discrete liver lesion. Liver vessels were patent as expected. They do see some echogenic foci in the gallbladder lumen with possible posterior acoustic shadowing which would make them consider that you may have gallstones and/or some sludge balls there. That appears to be new versus your prior exam of June of last year. Common bile duct was 2 mm and that was within normal limits. Right kidney was 9.6 cm with no hydronephrosis. Spleen was normal at 11.3 cm. They suspect liver has some mild fatty infiltration and that you had these new gallstones versus sludge balls seen in the gallbladder lumen. In the prior study they mention a 1.2 cm echogenic lesion in the liver but they did not see this at this time. They recommended a 3 to 6-month follow-up right upper quadrant ultrasound and I would favor it being done at 3 months. I also noticed that you had a Dec 14 appt but no showed and you have not been rebooked to see us. Please call Sylvia at [phone] ext [phone] to be booked for an appt to see how you are doing and to review the plan and order this follow up.     Nov 2021 labs: Hepatitis B surface antibody remains immune but it is slightly lower on the titer/level at 35 rather than 51.1 prior. If drops to not detected, could give a booster dose of the hepatitis b vaccine for an amnestic response. Hep B quantitative DNA remains not detected. Glucose 99 BUN of 10 creatinine 1.22 and previously was 1.18 and prior to that 1.61. It is currently in normal range at 1.22. Sodium 136 potassium 4.4 albumin 4.4 and calcium 9.9 total bilirubin 0.7 alkaline phosphatase 80 AST 17 and ALT 10 with ideal ALT less than 35 so doing well there. White blood cell count 6.8 hemoglobin 14.7 which is up from 13.1. Platelet count normal at 236. MCV normal at 84. Neutrophils 3.7 and lymphocytes normal at 2.4. Dr. Harrington Dear Jerzy Hardy, August 5 labs show white blood cell count 8.8 RBC count slightly low at 4.09 with normal from 4.14 up to 5.8. Previously you were normal at 4.74 and 5.5 but clearly lower now. Please share with primary provider. Hemoglobin lower also at 13.1 from previous 14.5 and prior 16.8. Hemoglobin currently at the lower side of normal. Platelet count 318. Neutrophils normal at 4.4 with lymphocytes up a little bit at 3.5. Were you ill recently? Glucose 72 BUN down to 15 from 28. Creatinine down to 1.18 from 1.61. Sodium 140 potassium 4.8 calcium 9.8 albumin 3.9 bilirubin 0.4 alkaline phosphatase 85 AST 13 ALT 13. Previously AST 13 and ALT of 8. Hep B DNA not detected. So labs for liver better and creatinine and suspect prior diarrhea and colitis issues had affected him and dropped the hg. He says not bleeding now and bowels better. Pt says also had a cold and so maybe explains the lymphocytes being little up. Spoke with pt. Dr Harrington  June 23 2021 labs show hepatitis B immunity so you are protected. Hep B DNA not detected. Glucose 92 BUN of 28 creatinine is elevated at 1.61 and has varied from 1.23 up to 1.53 before. We need to look at your hydration status with these changes. The summer heat has been kicking up lately. Sodium 137 potassium 4.9 calcium 9.9 albumin 4.3 bilirubin 0.5 alkaline phosphatase 95 AST down to 13 ALT of 8 previously AST 29 ALT 17 so liver enzymes definitely lower. White blood cell count 7 hemoglobin 14.5 platelet count 315 MCV 94 neutrophils 3.4. Per notes on tenofovir df po qd and so still above 50. Egfr 55. Having diarrhea at the time and he says that it is better.  He did the 2nd entyvio now about 2 weeks ago and says starting to do better now.  He says the diarrhea not there now and some hemorrhodis.  Dr Silva saw recently with Namrata and they see him again sept. Did entyvio aug 23.  Aug 5 wbc 8.8 and hg 13.1 plat 318 and mcv 95 and neutrophils 4.4 and lymph 3.5 little up and glu 72 and cr 1.18 and na 140 and k 4.8 and cl 103 and c02 26 and alb 3.9 and tv 0.4 and alk 85 and ast and alt 13. HBV pending.  Recap: Patient was last seen back on Radha 3 by Namrata Pillai PA-C, for his noted hx of history of ulcerative colitis left-sided first noted in 2011. Previously followed by Dr. Rasmussen and few others . Also had seen Dr En Mattson as well prior.  Had been on Remicade 5 mg/kg every 8 weeks since 2011 and did not respond to oral treatment prior. No known complications or surgeries noted.  Patient on the Remicade had been much better but never was in clinical remission and still with persistent joint pains.  Patient in the fall 2020 was noted flaring and a [employer] panel showed undetected Remicade levels and presence of antibodies.  He was transitioned to Humira in November 2020 on a every 2-week basis and is being followed for this. He says when getting higher dose doing beter and then more issues when lower.  During induction he felt well but with maintenance started had some increase in symptoms. 1 pen misfired in January. Noted to have some hematochezia in January and then more increased bright red blood per rectum with bowel movements. 10 to 15 bms per day. Patient lost 7 pounds.  Patient started on prednisone in June at 10 mg a day with some mild improvement during the day but still with nocturnal stools.  He was changed to budesonide 9mg a day and on that until 3rd dose. Also to do a suppository and says had issues when had diarrhea and doing better now also.  BM post meals 4 a day and most solid now. occ runny. Prior more watery.  Colonoscopy 2019 with 1 cm transverse colon adenoma.  August 2020 colonoscopy mild inflammation up to 15 cm from anal verge biopsies showed chronic active proctitis. Remaining colon normal. He stopped herbals. Only onprobiotic and only on culturelle a day.  Prior pt listed as taking some herbals specifically a papaya enzyme extract, some glucosamine sulfate, aloe vera, omega-3 as well as a probiotic. Also noted to be on allopurinol and colchicine for gout.  Weight noted to be 28.55 BMI.  Dec 2020 Liver enzymes show bilirubin 0.8 alkaline phosphatase 65 AST 29 ALT 17. White cell count 5.7 hemoglobin 60.8 plate count 224. Glucose 92 BUN of 17 creatinine 1.23 sodium 140 potassium 4.3. Patient had hepatitis as well as quantiferon on Radha 3 and was indeterminant and to get cxr for tb. No symptoms from this. October 2020 labs showed QuantiFERON gold plus was negative. White cell count 5.7 hemoglobin 16.8 platelet count 224 back in December 2020 AST then 29 ALT 17 alk phos 65 total bilirubin 0.8 sodium 140 potassium 4.3 glucose 92 BUN of 17 creatinine 1.23. Vitamin D level 86.6. June 2021 labs show hep B surface antigen negative hep B surface antibody immune at 51.1.  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  He retired  and did shots and he does not remember having the hep b.  These meds on suppress immune system. Can reactivate the hep. 2011 to now not reactivated but never know and also havign some ab and not as immune suppressed and now on steroids and new med.  Typically need a medicine to prevent this as if does reactivate and it does not usually clear as most people stay on this.  Some meds follow and do labs 1-3 m and treat as needed if low risk but this class is high risk.  HBV prevention meds have risk and need to follow labs as they can affect kidneys and bones can be issues.  He is also on steroids and if this persists then bone risk and the first line med for hep b tenofovir df may have more risk than tenofovir af that less bone and renal risk and have to consider,  Bone density not done and need one and we can can ask Namrata re this and if abn then we need to look at starting that cliff if to stay on steroids.   Older records: October 2015 hep B DNA not detected. Hep B surface antigen negative. He did the u.s at [facility] in june 2021 and we did not see that result. Duration of visit was  minutes with minutes spent prepping chart and loading info for the visit to [facility] records and then additional minutes spent for this face to face/telemed visit reviewing chart and events and plans with the pt.  Plan: 1. Labs now and u.s now. 2. Plan for labs in 3m and 6m. 3. See in 6m. 4. Stay on vemlidy for the renal issues as less renal toxicity.  Duration of visit was 43 minutes with 10 minutes spent prepping chart and loading info for the visit to [facility] records and then additional 33 minutes by clock from 1013 to 1044am due to fluxes in his internet with time spent for this dox telemed reviewing chart and events and plans with the pt.

## 2024-07-08 ENCOUNTER — LAB OUTSIDE AN ENCOUNTER (OUTPATIENT)
Dept: URBAN - METROPOLITAN AREA CLINIC 86 | Facility: CLINIC | Age: 59
End: 2024-07-08

## 2024-07-24 ENCOUNTER — LAB OUTSIDE AN ENCOUNTER (OUTPATIENT)
Dept: URBAN - METROPOLITAN AREA CLINIC 86 | Facility: CLINIC | Age: 59
End: 2024-07-24

## 2024-07-25 ENCOUNTER — OFFICE VISIT (OUTPATIENT)
Dept: URBAN - METROPOLITAN AREA CLINIC 92 | Facility: CLINIC | Age: 59
End: 2024-07-25
Payer: COMMERCIAL

## 2024-07-25 VITALS
HEART RATE: 63 BPM | TEMPERATURE: 96.7 F | DIASTOLIC BLOOD PRESSURE: 82 MMHG | SYSTOLIC BLOOD PRESSURE: 140 MMHG | BODY MASS INDEX: 31.78 KG/M2 | HEIGHT: 70 IN | WEIGHT: 222 LBS

## 2024-07-25 DIAGNOSIS — Z79.899 HIGH RISK MEDICATION USE: ICD-10-CM

## 2024-07-25 DIAGNOSIS — K51.911 ULCERATIVE COLITIS, UNSPECIFIED WITH RECTAL BLEEDING: ICD-10-CM

## 2024-07-25 PROCEDURE — 99214 OFFICE O/P EST MOD 30 MIN: CPT | Performed by: PHYSICIAN ASSISTANT

## 2024-07-25 RX ORDER — OLMESARTAN MEDOXOMIL AND HYDROCHLOROTHIAZIDE 20; 12.5 MG/1; MG/1
1 TABLET TABLET ORAL ONCE A DAY
Status: ACTIVE | COMMUNITY

## 2024-07-25 RX ORDER — DULOXETINE HYDROCHLORIDE 60 MG/1
1 CAPSULE CAPSULE, DELAYED RELEASE ORAL ONCE A DAY
Status: ACTIVE | COMMUNITY

## 2024-07-25 RX ORDER — UPADACITINIB 30 MG/1
TAKE 1 TABLET BY MOUTH 1 TIME A DAY TABLET, EXTENDED RELEASE ORAL
Qty: 30 | Refills: 6 | Status: ACTIVE | COMMUNITY

## 2024-07-25 RX ORDER — ATORVASTATIN CALCIUM 20 MG/1
1 TABLET TABLET, FILM COATED ORAL ONCE A DAY
Status: ACTIVE | COMMUNITY

## 2024-07-25 RX ORDER — TENOFOVIR ALAFENAMIDE 25 MG/1
1 TABLET WITH FOOD TABLET ORAL ONCE A DAY
Qty: 30 TABLET | Refills: 2 | Status: ACTIVE | COMMUNITY

## 2024-07-25 RX ORDER — COLCHICINE 0.6 MG/1
1 CAPSULE CAPSULE ORAL
Status: ACTIVE | COMMUNITY

## 2024-07-25 RX ORDER — TENOFOVIR ALAFENAMIDE 25 MG/1
TAKE 1 TABLET BY MOUTH ONCE DAILY WITH FOOD TABLET ORAL
Qty: 30 TABLET | Refills: 1 | Status: ACTIVE | COMMUNITY

## 2024-07-25 NOTE — HPI-TODAY'S VISIT:
59yoM with hx of UC, left sided dx in 2011. Previously followed by Dr. Osorio. Had been on Remicade with initialy improvement but lost response with the presence of antibodies. He was transitioned to Humira in 11/2020 but despite this persistent sx and drug levels 1.7 + detectable AB. Failed to respond to Entyvio and was admitted to [facility] JAN 2022 AND colonoscopy showed continuous circumferential inflammation from rectum to transverse colon, whelan grade 2-3, path with moderate colitis rectum, L colon and transverse, neg for CMV. he was started on IV steroids, transitioned to po and in Sept 2022 started Rinvoq with immediate clinical remission and resolution of arthritis. BMs once/day, no blood, no urgency. No abd pain or joint pain. FC as below trended up some but pt felt well-repeat FC submitted yesterday.   Had elevated cholesterol but saw PCP and on stain and reports normalization of cholesterol in last few months and decreased statin medicine.  Colonoscopy 8/2020 with moderate inflammation upto 15cm from anal verge. Bx + chronic active proctitis   Repeat Colonoscopy 11/11/2022 showed patchy mild erythema in desc, transv, ascending, bx X6 without active UC.   Follows with Dr. Harrington/Liver clinic for Hep B core ab, was on viread but stopped and on vemlidy. US 8/2023 + sludge/stones but no acute amari + hepatic steatosis  Got shingles vaccine, covid and flu

## 2024-08-01 ENCOUNTER — TELEPHONE ENCOUNTER (OUTPATIENT)
Dept: URBAN - METROPOLITAN AREA CLINIC 92 | Facility: CLINIC | Age: 59
End: 2024-08-01

## 2024-08-08 ENCOUNTER — ERX REFILL RESPONSE (OUTPATIENT)
Dept: URBAN - METROPOLITAN AREA CLINIC 92 | Facility: CLINIC | Age: 59
End: 2024-08-08

## 2024-08-08 RX ORDER — UPADACITINIB 30 MG/1
TAKE 1 TABLET BY MOUTH 1 TIME A DAY TABLET, EXTENDED RELEASE ORAL
Qty: 30 | Refills: 6 | OUTPATIENT

## 2024-09-04 ENCOUNTER — LAB OUTSIDE AN ENCOUNTER (OUTPATIENT)
Dept: URBAN - METROPOLITAN AREA TELEHEALTH 2 | Facility: TELEHEALTH | Age: 59
End: 2024-09-04

## 2024-09-04 ENCOUNTER — ERX REFILL RESPONSE (OUTPATIENT)
Dept: URBAN - METROPOLITAN AREA CLINIC 86 | Facility: CLINIC | Age: 59
End: 2024-09-04

## 2024-09-04 RX ORDER — TENOFOVIR ALAFENAMIDE 25 MG/1
TAKE 1 TABLET BY MOUTH 1 TIME A DAY WITH FOOD TABLET ORAL
Qty: 30 TABLET | Refills: 0 | OUTPATIENT

## 2024-09-04 RX ORDER — TENOFOVIR ALAFENAMIDE 25 MG/1
1 TABLET WITH FOOD TABLET ORAL ONCE A DAY
Qty: 30 TABLET | Refills: 2 | OUTPATIENT

## 2024-10-17 ENCOUNTER — ERX REFILL RESPONSE (OUTPATIENT)
Dept: URBAN - METROPOLITAN AREA CLINIC 86 | Facility: CLINIC | Age: 59
End: 2024-10-17

## 2024-10-17 RX ORDER — TENOFOVIR ALAFENAMIDE 25 MG/1
TAKE 1 TABLET BY MOUTH 1 TIME A DAY WITH FOOD TABLET ORAL
Qty: 30 TABLET | Refills: 0 | OUTPATIENT

## 2024-11-04 ENCOUNTER — LAB OUTSIDE AN ENCOUNTER (OUTPATIENT)
Dept: URBAN - METROPOLITAN AREA CLINIC 92 | Facility: CLINIC | Age: 59
End: 2024-11-04

## 2024-11-15 ENCOUNTER — OFFICE VISIT (OUTPATIENT)
Dept: URBAN - METROPOLITAN AREA SURGERY CENTER 16 | Facility: SURGERY CENTER | Age: 59
End: 2024-11-15

## 2024-12-04 ENCOUNTER — LAB OUTSIDE AN ENCOUNTER (OUTPATIENT)
Dept: URBAN - METROPOLITAN AREA TELEHEALTH 2 | Facility: TELEHEALTH | Age: 59
End: 2024-12-04

## 2024-12-05 ENCOUNTER — OFFICE VISIT (OUTPATIENT)
Dept: URBAN - METROPOLITAN AREA CLINIC 91 | Facility: CLINIC | Age: 59
End: 2024-12-05

## 2024-12-05 ENCOUNTER — OFFICE VISIT (OUTPATIENT)
Dept: URBAN - METROPOLITAN AREA CLINIC 86 | Facility: CLINIC | Age: 59
End: 2024-12-05

## 2024-12-05 RX ORDER — DULOXETINE HYDROCHLORIDE 60 MG/1
1 CAPSULE CAPSULE, DELAYED RELEASE ORAL ONCE A DAY
Status: ACTIVE | COMMUNITY

## 2024-12-05 RX ORDER — TENOFOVIR ALAFENAMIDE 25 MG/1
1 TABLET WITH FOOD TABLET ORAL ONCE A DAY
Qty: 30 TABLET | Refills: 2 | OUTPATIENT

## 2024-12-05 RX ORDER — TENOFOVIR ALAFENAMIDE 25 MG/1
TAKE 1 TABLET BY MOUTH 1 TIME A DAY WITH FOOD TABLET ORAL
Qty: 30 TABLET | Refills: 0 | Status: ACTIVE | COMMUNITY

## 2024-12-05 RX ORDER — ATORVASTATIN CALCIUM 20 MG/1
1 TABLET TABLET, FILM COATED ORAL ONCE A DAY
Status: ACTIVE | COMMUNITY

## 2024-12-05 RX ORDER — COLCHICINE 0.6 MG/1
1 CAPSULE CAPSULE ORAL
Status: ACTIVE | COMMUNITY

## 2024-12-05 RX ORDER — OLMESARTAN MEDOXOMIL AND HYDROCHLOROTHIAZIDE 20; 12.5 MG/1; MG/1
1 TABLET TABLET ORAL ONCE A DAY
Status: ACTIVE | COMMUNITY

## 2024-12-05 RX ORDER — UPADACITINIB 30 MG/1
TAKE 1 TABLET BY MOUTH 1 TIME A DAY TABLET, EXTENDED RELEASE ORAL
Qty: 30 | Refills: 6 | Status: ACTIVE | COMMUNITY

## 2024-12-19 ENCOUNTER — OFFICE VISIT (OUTPATIENT)
Dept: URBAN - METROPOLITAN AREA CLINIC 124 | Facility: CLINIC | Age: 59
End: 2024-12-19

## 2025-02-03 ENCOUNTER — CLAIMS CREATED FROM THE CLAIM WINDOW (OUTPATIENT)
Dept: URBAN - METROPOLITAN AREA MEDICAL CENTER 18 | Facility: MEDICAL CENTER | Age: 60
End: 2025-02-03
Payer: MEDICARE

## 2025-02-03 DIAGNOSIS — K51.50 ACUTE LEFT-SIDED ULCERATIVE COLITIS: ICD-10-CM

## 2025-02-03 DIAGNOSIS — K85.80 ACUTE TRAUMATIC PANCREATITIS: ICD-10-CM

## 2025-02-03 PROCEDURE — G8427 DOCREV CUR MEDS BY ELIG CLIN: HCPCS

## 2025-02-03 PROCEDURE — 99254 IP/OBS CNSLTJ NEW/EST MOD 60: CPT

## 2025-02-03 PROCEDURE — 99222 1ST HOSP IP/OBS MODERATE 55: CPT

## 2025-02-04 ENCOUNTER — CLAIMS CREATED FROM THE CLAIM WINDOW (OUTPATIENT)
Dept: URBAN - METROPOLITAN AREA MEDICAL CENTER 18 | Facility: MEDICAL CENTER | Age: 60
End: 2025-02-04
Payer: MEDICARE

## 2025-02-04 DIAGNOSIS — K85.80 ACUTE TRAUMATIC PANCREATITIS: ICD-10-CM

## 2025-02-04 PROCEDURE — 99232 SBSQ HOSP IP/OBS MODERATE 35: CPT

## 2025-02-05 ENCOUNTER — CLAIMS CREATED FROM THE CLAIM WINDOW (OUTPATIENT)
Dept: URBAN - METROPOLITAN AREA MEDICAL CENTER 18 | Facility: MEDICAL CENTER | Age: 60
End: 2025-02-05
Payer: MEDICARE

## 2025-02-05 DIAGNOSIS — K85.80 ACUTE TRAUMATIC PANCREATITIS: ICD-10-CM

## 2025-02-05 PROCEDURE — 99232 SBSQ HOSP IP/OBS MODERATE 35: CPT

## 2025-02-06 ENCOUNTER — CLAIMS CREATED FROM THE CLAIM WINDOW (OUTPATIENT)
Dept: URBAN - METROPOLITAN AREA MEDICAL CENTER 18 | Facility: MEDICAL CENTER | Age: 60
End: 2025-02-06
Payer: MEDICARE

## 2025-02-06 DIAGNOSIS — K51.80 CHRONIC PANCOLONIC ULCERATIVE COLITIS: ICD-10-CM

## 2025-02-06 DIAGNOSIS — K85.80 OTHER ACUTE PANCREATITIS: ICD-10-CM

## 2025-02-06 PROCEDURE — 99232 SBSQ HOSP IP/OBS MODERATE 35: CPT

## 2025-02-07 ENCOUNTER — TELEPHONE ENCOUNTER (OUTPATIENT)
Dept: URBAN - METROPOLITAN AREA MEDICAL CENTER 18 | Facility: MEDICAL CENTER | Age: 60
End: 2025-02-07

## 2025-06-25 ENCOUNTER — TELEPHONE ENCOUNTER (OUTPATIENT)
Dept: URBAN - METROPOLITAN AREA CLINIC 124 | Facility: CLINIC | Age: 60
End: 2025-06-25

## 2025-06-25 RX ORDER — UPADACITINIB 30 MG/1
TAKE 1 TABLET BY MOUTH 1 TIME A DAY TABLET, EXTENDED RELEASE ORAL
Qty: 30 | Refills: 6

## 2025-07-01 ENCOUNTER — TELEPHONE ENCOUNTER (OUTPATIENT)
Dept: URBAN - METROPOLITAN AREA CLINIC 124 | Facility: CLINIC | Age: 60
End: 2025-07-01

## 2025-07-01 ENCOUNTER — OFFICE VISIT (OUTPATIENT)
Dept: URBAN - METROPOLITAN AREA TELEHEALTH 2 | Facility: TELEHEALTH | Age: 60
End: 2025-07-01
Payer: COMMERCIAL

## 2025-07-01 DIAGNOSIS — K51.911 ULCERATIVE COLITIS, UNSPECIFIED WITH RECTAL BLEEDING: ICD-10-CM

## 2025-07-01 DIAGNOSIS — Z79.899 HIGH RISK MEDICATION USE: ICD-10-CM

## 2025-07-01 PROCEDURE — 99214 OFFICE O/P EST MOD 30 MIN: CPT | Performed by: PHYSICIAN ASSISTANT

## 2025-07-01 RX ORDER — OLMESARTAN MEDOXOMIL AND HYDROCHLOROTHIAZIDE 20; 12.5 MG/1; MG/1
1 TABLET TABLET ORAL ONCE A DAY
Status: ACTIVE | COMMUNITY

## 2025-07-01 RX ORDER — COLCHICINE 0.6 MG/1
1 CAPSULE CAPSULE ORAL
Status: ACTIVE | COMMUNITY

## 2025-07-01 RX ORDER — TENOFOVIR ALAFENAMIDE 25 MG/1
1 TABLET WITH FOOD TABLET ORAL ONCE A DAY
Qty: 30 TABLET | Refills: 2 | Status: ACTIVE | COMMUNITY

## 2025-07-01 RX ORDER — UPADACITINIB 30 MG/1
TAKE 1 TABLET BY MOUTH 1 TIME A DAY TABLET, EXTENDED RELEASE ORAL
Qty: 30 | Refills: 6 | Status: ACTIVE | COMMUNITY

## 2025-07-01 RX ORDER — TENOFOVIR ALAFENAMIDE 25 MG/1
TAKE 1 TABLET BY MOUTH 1 TIME A DAY WITH FOOD TABLET ORAL
Qty: 30 TABLET | Refills: 0 | Status: ACTIVE | COMMUNITY

## 2025-07-01 RX ORDER — DULOXETINE HYDROCHLORIDE 60 MG/1
1 CAPSULE CAPSULE, DELAYED RELEASE ORAL ONCE A DAY
Status: ACTIVE | COMMUNITY

## 2025-07-01 RX ORDER — ATORVASTATIN CALCIUM 20 MG/1
1 TABLET TABLET, FILM COATED ORAL ONCE A DAY
Status: ACTIVE | COMMUNITY

## 2025-07-01 NOTE — HPI-TODAY'S VISIT:
60yoM with hx of UC, left sided dx in 2011. Previously followed by Dr. Osorio. Had been on Remicade with initial improvement but lost response with the presence of antibodies. He was transitioned to Humira in 11/2020 but despite this persistent sx and drug levels 1.7 + detectable AB. Failed to respond to Entyvio and was admitted to [facility] JAN 2022 AND colonoscopy showed continuous circumferential inflammation from rectum to transverse colon, whelan grade 2-3, path with moderate colitis rectum, L colon and transverse, neg for CMV. he was started on IV steroids, transitioned to po and in Sept 2022 started Rinvoq with immediate clinical remission and resolution of arthritis. He remained in clinical remission with BMs 1-2/day, formed and non-bloody but has had lack of f/u in the last year and slight increase in calpro last year.   He now notes he has been off Rinvoq since Feb and reports being admitted to [facility] (not on epic system to review) for DKA-was in a coma and then following this took "a street drug" and was re-admitted and had 2 cardiac blockages s/p stent. Cards-Dr. Camara. He reports she is aware of him being on rinvoq and ok with it. He had known elevated cholesterol, on statin with normalization of levels.   Colonoscopy 8/2020 with moderate inflammation upto 15cm from anal verge. Bx + chronic active proctitis Repeat Colonoscopy 11/11/2022 showed patchy mild erythema in desc, transv, ascending, bx X6 without active UC.   Follows with Dr. Harrington/Liver clinic for Hep B core ab, was on viread but stopped and on vemlidy.  Got shingles vaccine, covid and flu

## 2025-07-09 ENCOUNTER — TELEPHONE ENCOUNTER (OUTPATIENT)
Dept: URBAN - METROPOLITAN AREA CLINIC 124 | Facility: CLINIC | Age: 60
End: 2025-07-09

## 2025-07-17 ENCOUNTER — TELEPHONE ENCOUNTER (OUTPATIENT)
Dept: URBAN - METROPOLITAN AREA CLINIC 124 | Facility: CLINIC | Age: 60
End: 2025-07-17

## 2025-07-17 ENCOUNTER — OFFICE VISIT (OUTPATIENT)
Dept: URBAN - METROPOLITAN AREA TELEHEALTH 2 | Facility: TELEHEALTH | Age: 60
End: 2025-07-17
Payer: COMMERCIAL

## 2025-07-17 DIAGNOSIS — K51.911 ULCERATIVE COLITIS, UNSPECIFIED WITH RECTAL BLEEDING: ICD-10-CM

## 2025-07-17 DIAGNOSIS — Z79.899 HIGH RISK MEDICATION USE: ICD-10-CM

## 2025-07-17 PROCEDURE — 99214 OFFICE O/P EST MOD 30 MIN: CPT | Performed by: PHYSICIAN ASSISTANT

## 2025-07-17 RX ORDER — TENOFOVIR ALAFENAMIDE 25 MG/1
TAKE 1 TABLET BY MOUTH 1 TIME A DAY WITH FOOD TABLET ORAL
Qty: 30 TABLET | Refills: 0 | Status: ACTIVE | COMMUNITY

## 2025-07-17 RX ORDER — COLCHICINE 0.6 MG/1
1 CAPSULE CAPSULE ORAL
Status: ACTIVE | COMMUNITY

## 2025-07-17 RX ORDER — TENOFOVIR ALAFENAMIDE 25 MG/1
1 TABLET WITH FOOD TABLET ORAL ONCE A DAY
Qty: 30 TABLET | Refills: 2 | Status: ACTIVE | COMMUNITY

## 2025-07-17 RX ORDER — DULOXETINE HYDROCHLORIDE 60 MG/1
1 CAPSULE CAPSULE, DELAYED RELEASE ORAL ONCE A DAY
Status: ACTIVE | COMMUNITY

## 2025-07-17 RX ORDER — ATORVASTATIN CALCIUM 20 MG/1
1 TABLET TABLET, FILM COATED ORAL ONCE A DAY
Status: ACTIVE | COMMUNITY

## 2025-07-17 RX ORDER — OLMESARTAN MEDOXOMIL AND HYDROCHLOROTHIAZIDE 20; 12.5 MG/1; MG/1
1 TABLET TABLET ORAL ONCE A DAY
Status: ACTIVE | COMMUNITY

## 2025-07-17 RX ORDER — UPADACITINIB 30 MG/1
TAKE 1 TABLET BY MOUTH 1 TIME A DAY TABLET, EXTENDED RELEASE ORAL
Qty: 30 | Refills: 6 | Status: ACTIVE | COMMUNITY

## 2025-07-17 NOTE — HPI-TODAY'S VISIT:
60yoM with hx of UC, left sided dx in 2011. Previously followed by Dr. Osorio. Had been on Remicade with initial improvement but lost response with the presence of antibodies. He was transitioned to Humira in 11/2020 but despite this persistent sx and drug levels 1.7 + detectable AB. Failed to respond to Entyvio and was admitted to [facility] JAN 2022 AND colonoscopy showed continuous circumferential inflammation from rectum to transverse colon, whelan grade 2-3, path with moderate colitis rectum, L colon and transverse, neg for CMV. he was started on IV steroids, transitioned to po and in Sept 2022 started Rinvoq with immediate clinical remission and resolution of arthritis. He remained in clinical remission with BMs 1-2/day, formed and non-bloody but has had lack of f/u in the last year and slight increase in calpro last year.  He then presented back 2 weeks ago noting he had been off Rinvoq since Feb and reports being admitted to [facility] (not on epic system to review) for DKA-was in a coma and then following this took "a street drug" and was re-admitted and had 2 cardiac blockages s/p stent. Cards-Dr. Camara. He had known elevated cholesterol, on statin with normalization of levels. He reported a colonocopy while admitted that showed no active UC but again no records to review. He is still in clinical remission and denies diarrhea, urgency, hematochezia, abd pain. BMs 1-2/day.   Colonoscopy 8/2020 with moderate inflammation upto 15cm from anal verge. Bx + chronic active proctitis Repeat Colonoscopy 11/11/2022 showed patchy mild erythema in desc, transv, ascending, bx X6 without active UC.   Follows with Dr. Harrington/Liver clinic for Hep B core ab, was on viread but stopped and on vemlidy. Needs f/u  Got shingles vaccine, covid and flu   I spoke to his cardiologist as documented in TE and she is ok with rinvoq and has him on multiple card meds including AC

## 2025-07-18 LAB — CALPROTECTIN, FECAL: 424

## 2025-07-25 LAB
A/G RATIO: 1.6
ABSOLUTE BASOPHILS: 9
ABSOLUTE EOSINOPHILS: 49
ABSOLUTE LYMPHOCYTES: 1067
ABSOLUTE MONOCYTES: 313
ABSOLUTE NEUTROPHILS: 1462
ALBUMIN: 4.1
ALKALINE PHOSPHATASE: 61
ALT (SGPT): 17
AST (SGOT): 20
BASOPHILS: 0.3
BILIRUBIN, TOTAL: 0.5
BUN/CREATININE RATIO: (no result)
BUN: 8
C-REACTIVE PROTEIN, QUANT: 23
CALCIUM: 9.4
CARBON DIOXIDE, TOTAL: 25
CHLORIDE: 106
CREATININE: 1.01
EGFR: 86
EOSINOPHILS: 1.7
GLOBULIN, TOTAL: 2.6
GLUCOSE: 103
HEMATOCRIT: 41.2
HEMOGLOBIN: 12.9
LYMPHOCYTES: 36.8
MCH: 29.5
MCHC: 31.3
MCV: 94.1
MITOGEN-NIL: 9.37
MONOCYTES: 10.8
MPV: 9.8
NEUTROPHILS: 50.4
PLATELET COUNT: 225
POTASSIUM: 4.3
PROTEIN, TOTAL: 6.7
QUANTIFERON NIL VALUE: 0.04
QUANTIFERON TB1 AG VALUE: <0
QUANTIFERON TB2 AG VALUE: <0
QUANTIFERON-TB GOLD PLUS: NEGATIVE
RDW: 14.4
RED BLOOD CELL COUNT: 4.38
SODIUM: 141
WHITE BLOOD CELL COUNT: 2.9

## 2025-07-31 ENCOUNTER — OFFICE VISIT (OUTPATIENT)
Dept: URBAN - METROPOLITAN AREA TELEHEALTH 2 | Facility: TELEHEALTH | Age: 60
End: 2025-07-31

## 2025-07-31 ENCOUNTER — LAB OUTSIDE AN ENCOUNTER (OUTPATIENT)
Dept: URBAN - METROPOLITAN AREA TELEHEALTH 2 | Facility: TELEHEALTH | Age: 60
End: 2025-07-31

## 2025-07-31 RX ORDER — OLMESARTAN MEDOXOMIL AND HYDROCHLOROTHIAZIDE 20; 12.5 MG/1; MG/1
1 TABLET TABLET ORAL ONCE A DAY
Status: ACTIVE | COMMUNITY

## 2025-07-31 RX ORDER — TENOFOVIR ALAFENAMIDE 25 MG/1
TAKE 1 TABLET BY MOUTH 1 TIME A DAY WITH FOOD TABLET ORAL
Qty: 30 TABLET | Refills: 0 | Status: ACTIVE | COMMUNITY

## 2025-07-31 RX ORDER — TENOFOVIR ALAFENAMIDE 25 MG/1
1 TABLET WITH FOOD TABLET ORAL ONCE A DAY
Qty: 30 TABLET | Refills: 2 | Status: ACTIVE | COMMUNITY

## 2025-07-31 RX ORDER — UPADACITINIB 30 MG/1
TAKE 1 TABLET BY MOUTH 1 TIME A DAY TABLET, EXTENDED RELEASE ORAL
Qty: 30 | Refills: 6 | Status: ON HOLD | COMMUNITY

## 2025-07-31 RX ORDER — COLCHICINE 0.6 MG/1
1 CAPSULE CAPSULE ORAL
Status: ACTIVE | COMMUNITY

## 2025-07-31 RX ORDER — DULOXETINE HYDROCHLORIDE 60 MG/1
1 CAPSULE CAPSULE, DELAYED RELEASE ORAL ONCE A DAY
Status: ACTIVE | COMMUNITY

## 2025-07-31 RX ORDER — TENOFOVIR ALAFENAMIDE 25 MG/1
1 TABLET WITH FOOD TABLET ORAL ONCE A DAY
Qty: 30 TABLET | Refills: 2 | OUTPATIENT
Start: 2025-07-31 | End: 2025-10-29

## 2025-07-31 RX ORDER — ATORVASTATIN CALCIUM 20 MG/1
1 TABLET TABLET, FILM COATED ORAL ONCE A DAY
Status: ACTIVE | COMMUNITY

## 2025-07-31 NOTE — HPI-TODAY'S VISIT:
Patient is a 59-year-old male referred by Dr. Yonatan Silva and last seen June 2024 for evaluation of liver issue hep b core positive and on tx now for HBV prophylaxis for same due to ongoing isp.  A copy of the note will be sent to Dr. Yonatan Silva.  Pt is on preventative meds for the hep b core.  7/31/25 Feb and reports being admitted to [facility] (not on epic system to review) for DKA-was in a coma and then following this took "a street drug" and was re-admitted and had 2 cardiac blockages s/p stent. Cards-Dr. Camara. He had known elevated cholesterol, on statin with normalization of levels. He reported a colonocopy while admitted that showed no active UC but again no records to review.   He said ER again in june and had gb infection      recap 3/27/24 labs were sent to me. The white blood cell 6.1, hemoglobin 14.5, MCV 95, platelets 299 this is normal. Blood sugar was 106 which is elevated if you are fasting. Creatinine up at 1.32, please share this with your primary care. Previously 1.43 to slightly lower this time. Bilirubin 0.7, alkaline phosphatase 69, AST 47 and ALT 64. These are still elevated. Goal is less than 35 for these. Hepatitis B is not detected. Really need to work on the fatty liver and the liver enzymes should improve. We will review this at your follow-up visit.  1/18/24 ultrasound was sent to [facility]. A liver with increased echogenicity suggesting fatty liver. Still need to work on this. They saw a gallbladder stone that is approximately 7 mm. Common bile duct is normal in size at 3.9 mm. The right kidney appeared normal. Spleen 10.0 centimeters and this is normal. The hepatic vasculature is patent. Overall they saw fatty liver and a gallstone and really need to work on the diet, exercise, and weight loss to help with the fatty liver as discussed at the visit.  January 18 labs were sent to me. The gluc was 104 which is elevated if you are fasting. Please share with the primary care. The creatinine 1.43 and this is also elevated. Previously 1.56 is slightly lower. The bilirubin 0.7, alkaline phosphatase 61, AST 40, ALT 52. Previously the AST was 55 and ALT was 84 back in October so much lower now. Goal is less than 35 so you still need to work on things the hepatitis B is not detected. The complete blood count including the white blood cells, red blood cells, hemoglobin and platelets all normal.  Asked re weight and has lost some weight and follow.  Took trip.  1/18/24 office visit he had the US this morning but they were still verifying the insurance issues he is seeing pcp with PSA issues  Needs to see what the labs look like.    10/26/23 telemed 10/19/23 labs The complete blood count overall is normal other than the eosinophils are slightly low and I would recommend sharing that with your primary care. The creatinine or kidney function elevated at 1.56 and I would also recommend sharing this with the primary care. Previously back in June of this year was 1.44. The bilirubin 0.5, alkaline phosphatase 80, AST 55, ALT 54. Back in June the AST was 44 and the ALT was 36. What changed? The hepatitis B DNA is listed as being less than 10 but being detected still having missed any doses of the medication? It could be that these labs are very sensitive. This still does not necessarily explain why the labs are high. Has the diet been bad? Any recent illness? Please let me know.    he missed 3-4 doses. med delayed  he had covid at the end of sept/beginning of oct and then had paxlovid so could explain the bump   7/25/23 ov   The July 19, 2023 ultrasound was sent to me. The liver with diffuse increased echogenicity suggesting fatty liver. No lesions. The common bile duct was 3 mm and they thought they saw gallstones within the gallbladder. The right kidney is 9.3 cm and appears normal. Spleen 9.7 cm and appears normal. The hepatic vasculature is patent. Overall they saw fatty liver and as discussed the treatment for this is diet, exercise, and weight loss. We will review this at the follow-up.   The creatinine 1.44, elevated and please share with the primary care. The sodium 136, potassium 4.9, bilirubin 0.5, ast 44, previously 42, alt 36 previously 41. Goal for the ast/alt is less than 35. HBV dna is listed as detected but not quantifiable. Curious if this is a false positive test as previously in May 2023 this was not detected and you have immunity to the virus which is protected and the surface antigen is negative therefore should not have any virus. We will need to check this again. I'll include a lab slip with this and you can check it again locally. The complete blood count is normal other than the eosinophils are low and recommend sharing this with the primary care. Please be sure to do the lab include with this letter and I will follow up with that result.  7/19/23 hbv not detected and hepatic panel done and ast 34 alt 31. clearly the previous lab was error. the ast and alt improved and asked what changed he has been working on diet and avoiding fried foods HE has been taking the vemlidy , gets from [facility]  He is on the RINvoq now for colitis issues and remains on this.  Hepatotoxicity In the prelicensure clinical trials of upadacitinib in patients with rheumatoid arthritis, liver test abnormalities were frequent although usually mild. Some degree of ALT elevation arose in up to 11% of upadacitinib treated patients compared to 7% treated with placebo, but were above 3 times the upper limit of normal (ULN) in 2% or less. Furthermore, similar rates of ALT elevations arose in patients treated with methotrexate or biologic DMARDs. In these trials that enrolled over 3000 patients, there were no reports of clinically apparent liver injury, serious cases of liver injury or liver-related deaths. Similarly, other IRIS inhibitors such as tofacitinib and baricitinib have been associated with frequent minor serum aminotransferase elevations during treatment, but episodes of clinically apparent liver injury have not been reported. Thus, this class of agents is suspected but not proven capable of causing liver injury.  In addition, long term treatment with upadacitinib and other Janus kinase inhibitors has been linked to rare instances of reactivation of hepatitis B that can be severe and has been linked to fatal outcomes. Reactivation can become clinically apparent after the JAK inhibitor is discontinued, when immune restoration results in an immunologic response to the heightened viral replication.  Likelihood score: D (possible, rare cause of clinically apparent liver injury including reactivation of hepatitis B in susceptible patients). Discussed the hep b and not on tx and need discussed chol lab and can be due to the renvoq Discussed the choletserol and while rinvoq can impact this also diet and disussed the rf for fatty lvier He was put on a cholesterol medication recently atorvastatin and we will follow this His cr is 1.62 and check today and will do vemlidy given this. discussed goal for ast and alt is less than 35. suspect up due to diet, hcol and will montior  recap January 13 ultrasound shows pancreas not well seen due to gas and body habitus. Limited exam was unremarkable. Liver was mildly echogenic and with no discrete liver lesion. Liver vessels were patent as expected. They do see some echogenic foci in the gallbladder lumen with possible posterior acoustic shadowing which would make them consider that you may have gallstones and/or some sludge balls there. That appears to be new versus your prior exam of June of last year. Common bile duct was 2 mm and that was within normal limits. Right kidney was 9.6 cm with no hydronephrosis. Spleen was normal at 11.3 cm. They suspect liver has some mild fatty infiltration and that you had these new gallstones versus sludge balls seen in the gallbladder lumen. In the prior study they mention a 1.2 cm echogenic lesion in the liver but they did not see this at this time. They recommended a 3 to 6-month follow-up right upper quadrant ultrasound and I would favor it being done at 3 months. I also noticed that you had a Dec 14 appt but no showed and you have not been rebooked to see us. Please call Sylvia at [phone] ext [phone] to be booked for an appt to see how you are doing and to review the plan and order this follow up.     Nov 2021 labs: Hepatitis B surface antibody remains immune but it is slightly lower on the titer/level at 35 rather than 51.1 prior. If drops to not detected, could give a booster dose of the hepatitis b vaccine for an amnestic response. Hep B quantitative DNA remains not detected. Glucose 99 BUN of 10 creatinine 1.22 and previously was 1.18 and prior to that 1.61. It is currently in normal range at 1.22. Sodium 136 potassium 4.4 albumin 4.4 and calcium 9.9 total bilirubin 0.7 alkaline phosphatase 80 AST 17 and ALT 10 with ideal ALT less than 35 so doing well there. White blood cell count 6.8 hemoglobin 14.7 which is up from 13.1. Platelet count normal at 236. MCV normal at 84. Neutrophils 3.7 and lymphocytes normal at 2.4. Dr. Harrington Dear Jerzy Hardy, August 5 labs show white blood cell count 8.8 RBC count slightly low at 4.09 with normal from 4.14 up to 5.8. Previously you were normal at 4.74 and 5.5 but clearly lower now. Please share with primary provider. Hemoglobin lower also at 13.1 from previous 14.5 and prior 16.8. Hemoglobin currently at the lower side of normal. Platelet count 318. Neutrophils normal at 4.4 with lymphocytes up a little bit at 3.5. Were you ill recently? Glucose 72 BUN down to 15 from 28. Creatinine down to 1.18 from 1.61. Sodium 140 potassium 4.8 calcium 9.8 albumin 3.9 bilirubin 0.4 alkaline phosphatase 85 AST 13 ALT 13. Previously AST 13 and ALT of 8. Hep B DNA not detected. So labs for liver better and creatinine and suspect prior diarrhea and colitis issues had affected him and dropped the hg. He says not bleeding now and bowels better. Pt says also had a cold and so maybe explains the lymphocytes being little up. Spoke with pt. Dr Harrington  June 23 2021 labs show hepatitis B immunity so you are protected. Hep B DNA not detected. Glucose 92 BUN of 28 creatinine is elevated at 1.61 and has varied from 1.23 up to 1.53 before. We need to look at your hydration status with these changes. The summer heat has been kicking up lately. Sodium 137 potassium 4.9 calcium 9.9 albumin 4.3 bilirubin 0.5 alkaline phosphatase 95 AST down to 13 ALT of 8 previously AST 29 ALT 17 so liver enzymes definitely lower. White blood cell count 7 hemoglobin 14.5 platelet count 315 MCV 94 neutrophils 3.4. Per notes on tenofovir df po qd and so still above 50. Egfr 55. Having diarrhea at the time and he says that it is better.  He did the 2nd entyvio now about 2 weeks ago and says starting to do better now.  He says the diarrhea not there now and some hemorrhodis.  Dr Silva saw recently with Namrata and they see him again sept. Did entyvio aug 23.  Aug 5 wbc 8.8 and hg 13.1 plat 318 and mcv 95 and neutrophils 4.4 and lymph 3.5 little up and glu 72 and cr 1.18 and na 140 and k 4.8 and cl 103 and c02 26 and alb 3.9 and tv 0.4 and alk 85 and ast and alt 13. HBV pending.  Recap: Patient was last seen back on Radha 3 by Namrata Pillai PA-C, for his noted hx of history of ulcerative colitis left-sided first noted in 2011. Previously followed by Dr. Rasmussen and few others . Also had seen Dr En Mattson as well prior.  Had been on Remicade 5 mg/kg every 8 weeks since 2011 and did not respond to oral treatment prior. No known complications or surgeries noted.  Patient on the Remicade had been much better but never was in clinical remission and still with persistent joint pains.  Patient in the fall 2020 was noted flaring and a [employer] panel showed undetected Remicade levels and presence of antibodies.  He was transitioned to Humira in November 2020 on a every 2-week basis and is being followed for this. He says when getting higher dose doing beter and then more issues when lower.  During induction he felt well but with maintenance started had some increase in symptoms. 1 pen misfired in January. Noted to have some hematochezia in January and then more increased bright red blood per rectum with bowel movements. 10 to 15 bms per day. Patient lost 7 pounds.  Patient started on prednisone in June at 10 mg a day with some mild improvement during the day but still with nocturnal stools.  He was changed to budesonide 9mg a day and on that until 3rd dose. Also to do a suppository and says had issues when had diarrhea and doing better now also.  BM post meals 4 a day and most solid now. occ runny. Prior more watery.  Colonoscopy 2019 with 1 cm transverse colon adenoma.  August 2020 colonoscopy mild inflammation up to 15 cm from anal verge biopsies showed chronic active proctitis. Remaining colon normal. He stopped herbals. Only onprobiotic and only on culturelle a day.  Prior pt listed as taking some herbals specifically a papaya enzyme extract, some glucosamine sulfate, aloe vera, omega-3 as well as a probiotic. Also noted to be on allopurinol and colchicine for gout.  Weight noted to be 28.55 BMI.  Dec 2020 Liver enzymes show bilirubin 0.8 alkaline phosphatase 65 AST 29 ALT 17. White cell count 5.7 hemoglobin 60.8 plate count 224. Glucose 92 BUN of 17 creatinine 1.23 sodium 140 potassium 4.3. Patient had hepatitis as well as quantiferon on Radha 3 and was indeterminant and to get cxr for tb. No symptoms from this. October 2020 labs showed QuantiFERON gold plus was negative. White cell count 5.7 hemoglobin 16.8 platelet count 224 back in December 2020 AST then 29 ALT 17 alk phos 65 total bilirubin 0.8 sodium 140 potassium 4.3 glucose 92 BUN of 17 creatinine 1.23. Vitamin D level 86.6. June 2021 labs show hep B surface antigen negative hep B surface antibody immune at 51.1.  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  He retired  and did shots and he does not remember having the hep b.  These meds on suppress immune system. Can reactivate the hep. 2011 to now not reactivated but never know and also havign some ab and not as immune suppressed and now on steroids and new med.  Typically need a medicine to prevent this as if does reactivate and it does not usually clear as most people stay on this.  Some meds follow and do labs 1-3 m and treat as needed if low risk but this class is high risk.  HBV prevention meds have risk and need to follow labs as they can affect kidneys and bones can be issues.  He is also on steroids and if this persists then bone risk and the first line med for hep b tenofovir df may have more risk than tenofovir af that less bone and renal risk and have to consider,  Bone density not done and need one and we can can ask Namrata re this and if abn then we need to look at starting that cliff if to stay on steroids.   Older records: October 2015 hep B DNA not detected. Hep B surface antigen negative. He did the [facility].s at [facility] in june 2021 and we did not see that result. Duration of visit was  minutes with minutes spent prepping chart and loading info for the visit to [facility] records and then additional minutes spent for this face to face/telemed visit reviewing chart and events and plans with the pt.